# Patient Record
Sex: FEMALE | Race: WHITE | NOT HISPANIC OR LATINO | Employment: PART TIME | URBAN - METROPOLITAN AREA
[De-identification: names, ages, dates, MRNs, and addresses within clinical notes are randomized per-mention and may not be internally consistent; named-entity substitution may affect disease eponyms.]

---

## 2022-04-18 ENCOUNTER — APPOINTMENT (OUTPATIENT)
Dept: CT IMAGING | Facility: HOSPITAL | Age: 37
End: 2022-04-18

## 2022-04-18 ENCOUNTER — APPOINTMENT (OUTPATIENT)
Dept: GENERAL RADIOLOGY | Facility: HOSPITAL | Age: 37
End: 2022-04-18

## 2022-04-18 ENCOUNTER — HOSPITAL ENCOUNTER (OUTPATIENT)
Facility: HOSPITAL | Age: 37
Setting detail: OBSERVATION
Discharge: HOME | End: 2022-04-19
Attending: EMERGENCY MEDICINE | Admitting: INTERNAL MEDICINE

## 2022-04-18 DIAGNOSIS — R53.1 RIGHT SIDED WEAKNESS: ICD-10-CM

## 2022-04-18 DIAGNOSIS — R41.841 COGNITIVE COMMUNICATION DEFICIT: ICD-10-CM

## 2022-04-18 DIAGNOSIS — R20.2 FACIAL PARESTHESIA: Primary | ICD-10-CM

## 2022-04-18 PROBLEM — R40.0 SOMNOLENCE: Status: ACTIVE | Noted: 2022-04-18

## 2022-04-18 PROBLEM — Z72.0 TOBACCO ABUSE: Status: ACTIVE | Noted: 2022-04-18

## 2022-04-18 LAB
ALT SERPL W P-5'-P-CCNC: 15 U/L (ref 1–33)
AMPHET+METHAMPHET UR QL: NEGATIVE
AMPHETAMINES UR QL: NEGATIVE
APTT PPP: 29.5 SECONDS (ref 22–39)
AST SERPL-CCNC: 17 U/L (ref 1–32)
BARBITURATES UR QL SCN: NEGATIVE
BASOPHILS # BLD AUTO: 0.03 10*3/MM3 (ref 0–0.2)
BASOPHILS NFR BLD AUTO: 0.4 % (ref 0–1.5)
BENZODIAZ UR QL SCN: NEGATIVE
BILIRUB UR QL STRIP: NEGATIVE
BUN BLDA-MCNC: 14 MG/DL (ref 8–26)
BUPRENORPHINE SERPL-MCNC: POSITIVE NG/ML
CA-I BLDA-SCNC: 1.23 MMOL/L (ref 1.2–1.32)
CANNABINOIDS SERPL QL: NEGATIVE
CHLORIDE BLDA-SCNC: 104 MMOL/L (ref 98–109)
CLARITY UR: CLEAR
CO2 BLDA-SCNC: 26 MMOL/L (ref 24–29)
COCAINE UR QL: NEGATIVE
COLOR UR: YELLOW
CREAT BLDA-MCNC: 0.7 MG/DL (ref 0.6–1.3)
DEPRECATED RDW RBC AUTO: 38.8 FL (ref 37–54)
EGFRCR SERPLBLD CKD-EPI 2021: 113 ML/MIN/1.73
EOSINOPHIL # BLD AUTO: 0.04 10*3/MM3 (ref 0–0.4)
EOSINOPHIL NFR BLD AUTO: 0.6 % (ref 0.3–6.2)
ERYTHROCYTE [DISTWIDTH] IN BLOOD BY AUTOMATED COUNT: 12 % (ref 12.3–15.4)
ETHANOL BLD-MCNC: <10 MG/DL (ref 0–10)
FLUAV SUBTYP SPEC NAA+PROBE: NOT DETECTED
FLUBV RNA ISLT QL NAA+PROBE: NOT DETECTED
GLUCOSE BLDC GLUCOMTR-MCNC: 90 MG/DL (ref 70–130)
GLUCOSE UR STRIP-MCNC: NEGATIVE MG/DL
HCT VFR BLD AUTO: 35.8 % (ref 34–46.6)
HCT VFR BLDA CALC: 37 % (ref 38–51)
HGB BLD-MCNC: 12.4 G/DL (ref 12–15.9)
HGB BLDA-MCNC: 12.6 G/DL (ref 12–17)
HGB UR QL STRIP.AUTO: NEGATIVE
HOLD SPECIMEN: NORMAL
IMM GRANULOCYTES # BLD AUTO: 0.01 10*3/MM3 (ref 0–0.05)
IMM GRANULOCYTES NFR BLD AUTO: 0.1 % (ref 0–0.5)
INR PPP: 0.9 (ref 0.8–1.2)
KETONES UR QL STRIP: NEGATIVE
LEUKOCYTE ESTERASE UR QL STRIP.AUTO: NEGATIVE
LYMPHOCYTES # BLD AUTO: 1.98 10*3/MM3 (ref 0.7–3.1)
LYMPHOCYTES NFR BLD AUTO: 29.7 % (ref 19.6–45.3)
MCH RBC QN AUTO: 30.4 PG (ref 26.6–33)
MCHC RBC AUTO-ENTMCNC: 34.6 G/DL (ref 31.5–35.7)
MCV RBC AUTO: 87.7 FL (ref 79–97)
METHADONE UR QL SCN: NEGATIVE
MONOCYTES # BLD AUTO: 0.38 10*3/MM3 (ref 0.1–0.9)
MONOCYTES NFR BLD AUTO: 5.7 % (ref 5–12)
NEUTROPHILS NFR BLD AUTO: 4.23 10*3/MM3 (ref 1.7–7)
NEUTROPHILS NFR BLD AUTO: 63.5 % (ref 42.7–76)
NITRITE UR QL STRIP: NEGATIVE
NRBC BLD AUTO-RTO: 0 /100 WBC (ref 0–0.2)
OPIATES UR QL: NEGATIVE
OXYCODONE UR QL SCN: NEGATIVE
PCP UR QL SCN: NEGATIVE
PH UR STRIP.AUTO: 7.5 [PH] (ref 5–8)
PLATELET # BLD AUTO: 198 10*3/MM3 (ref 140–450)
PMV BLD AUTO: 10.2 FL (ref 6–12)
POTASSIUM BLDA-SCNC: 3.5 MMOL/L (ref 3.5–4.9)
PROPOXYPH UR QL: NEGATIVE
PROT UR QL STRIP: NEGATIVE
PROTHROMBIN TIME: 11.4 SECONDS (ref 12.8–15.2)
RBC # BLD AUTO: 4.08 10*6/MM3 (ref 3.77–5.28)
SARS-COV-2 RNA PNL SPEC NAA+PROBE: NOT DETECTED
SODIUM BLD-SCNC: 139 MMOL/L (ref 138–146)
SP GR UR STRIP: 1.05 (ref 1–1.03)
TRICYCLICS UR QL SCN: NEGATIVE
TROPONIN T SERPL-MCNC: <0.01 NG/ML (ref 0–0.03)
UROBILINOGEN UR QL STRIP: ABNORMAL
WBC NRBC COR # BLD: 6.67 10*3/MM3 (ref 3.4–10.8)
WHOLE BLOOD HOLD SPECIMEN: NORMAL
WHOLE BLOOD HOLD SPECIMEN: NORMAL

## 2022-04-18 PROCEDURE — 93005 ELECTROCARDIOGRAM TRACING: CPT | Performed by: EMERGENCY MEDICINE

## 2022-04-18 PROCEDURE — 85014 HEMATOCRIT: CPT

## 2022-04-18 PROCEDURE — 85610 PROTHROMBIN TIME: CPT

## 2022-04-18 PROCEDURE — 82077 ASSAY SPEC XCP UR&BREATH IA: CPT | Performed by: EMERGENCY MEDICINE

## 2022-04-18 PROCEDURE — 99220 PR INITIAL OBSERVATION CARE/DAY 70 MINUTES: CPT | Performed by: INTERNAL MEDICINE

## 2022-04-18 PROCEDURE — 96374 THER/PROPH/DIAG INJ IV PUSH: CPT

## 2022-04-18 PROCEDURE — 0 IOPAMIDOL PER 1 ML: Performed by: EMERGENCY MEDICINE

## 2022-04-18 PROCEDURE — 99284 EMERGENCY DEPT VISIT MOD MDM: CPT

## 2022-04-18 PROCEDURE — G0378 HOSPITAL OBSERVATION PER HR: HCPCS

## 2022-04-18 PROCEDURE — 70496 CT ANGIOGRAPHY HEAD: CPT

## 2022-04-18 PROCEDURE — 80306 DRUG TEST PRSMV INSTRMNT: CPT | Performed by: EMERGENCY MEDICINE

## 2022-04-18 PROCEDURE — 99204 OFFICE O/P NEW MOD 45 MIN: CPT

## 2022-04-18 PROCEDURE — 81003 URINALYSIS AUTO W/O SCOPE: CPT | Performed by: EMERGENCY MEDICINE

## 2022-04-18 PROCEDURE — 85730 THROMBOPLASTIN TIME PARTIAL: CPT | Performed by: EMERGENCY MEDICINE

## 2022-04-18 PROCEDURE — C9803 HOPD COVID-19 SPEC COLLECT: HCPCS

## 2022-04-18 PROCEDURE — 0042T HC CT CEREBRAL PERFUSION W/WO CONTRAST: CPT

## 2022-04-18 PROCEDURE — 84484 ASSAY OF TROPONIN QUANT: CPT | Performed by: EMERGENCY MEDICINE

## 2022-04-18 PROCEDURE — 80047 BASIC METABLC PNL IONIZED CA: CPT

## 2022-04-18 PROCEDURE — 84460 ALANINE AMINO (ALT) (SGPT): CPT | Performed by: EMERGENCY MEDICINE

## 2022-04-18 PROCEDURE — 85025 COMPLETE CBC W/AUTO DIFF WBC: CPT | Performed by: EMERGENCY MEDICINE

## 2022-04-18 PROCEDURE — 87636 SARSCOV2 & INF A&B AMP PRB: CPT | Performed by: EMERGENCY MEDICINE

## 2022-04-18 PROCEDURE — 70498 CT ANGIOGRAPHY NECK: CPT

## 2022-04-18 PROCEDURE — 25010000002 LORAZEPAM PER 2 MG: Performed by: INTERNAL MEDICINE

## 2022-04-18 PROCEDURE — 70450 CT HEAD/BRAIN W/O DYE: CPT

## 2022-04-18 PROCEDURE — 71045 X-RAY EXAM CHEST 1 VIEW: CPT

## 2022-04-18 PROCEDURE — 84450 TRANSFERASE (AST) (SGOT): CPT | Performed by: EMERGENCY MEDICINE

## 2022-04-18 RX ORDER — POTASSIUM CHLORIDE 1.5 G/1.77G
40 POWDER, FOR SOLUTION ORAL AS NEEDED
Status: DISCONTINUED | OUTPATIENT
Start: 2022-04-18 | End: 2022-04-19 | Stop reason: HOSPADM

## 2022-04-18 RX ORDER — BUPRENORPHINE HYDROCHLORIDE AND NALOXONE HYDROCHLORIDE DIHYDRATE 8; 2 MG/1; MG/1
1 TABLET SUBLINGUAL 2 TIMES DAILY
COMMUNITY

## 2022-04-18 RX ORDER — SODIUM CHLORIDE 0.9 % (FLUSH) 0.9 %
10 SYRINGE (ML) INJECTION EVERY 12 HOURS SCHEDULED
Status: DISCONTINUED | OUTPATIENT
Start: 2022-04-18 | End: 2022-04-19 | Stop reason: HOSPADM

## 2022-04-18 RX ORDER — ASPIRIN 300 MG/1
300 SUPPOSITORY RECTAL DAILY
Status: DISCONTINUED | OUTPATIENT
Start: 2022-04-18 | End: 2022-04-19 | Stop reason: HOSPADM

## 2022-04-18 RX ORDER — LORAZEPAM 2 MG/ML
1 INJECTION INTRAMUSCULAR EVERY 4 HOURS PRN
Status: DISCONTINUED | OUTPATIENT
Start: 2022-04-18 | End: 2022-04-19

## 2022-04-18 RX ORDER — POTASSIUM CHLORIDE 750 MG/1
40 CAPSULE, EXTENDED RELEASE ORAL AS NEEDED
Status: DISCONTINUED | OUTPATIENT
Start: 2022-04-18 | End: 2022-04-19 | Stop reason: HOSPADM

## 2022-04-18 RX ORDER — NICOTINE 21 MG/24HR
1 PATCH, TRANSDERMAL 24 HOURS TRANSDERMAL
Status: DISCONTINUED | OUTPATIENT
Start: 2022-04-19 | End: 2022-04-18

## 2022-04-18 RX ORDER — CLONAZEPAM 0.5 MG/1
0.5 TABLET ORAL 3 TIMES DAILY
COMMUNITY

## 2022-04-18 RX ORDER — BUPRENORPHINE HYDROCHLORIDE AND NALOXONE HYDROCHLORIDE DIHYDRATE 8; 2 MG/1; MG/1
1 TABLET SUBLINGUAL 2 TIMES DAILY
Status: DISCONTINUED | OUTPATIENT
Start: 2022-04-19 | End: 2022-04-19 | Stop reason: HOSPADM

## 2022-04-18 RX ORDER — SODIUM CHLORIDE 0.9 % (FLUSH) 0.9 %
10 SYRINGE (ML) INJECTION AS NEEDED
Status: DISCONTINUED | OUTPATIENT
Start: 2022-04-18 | End: 2022-04-19 | Stop reason: HOSPADM

## 2022-04-18 RX ORDER — POTASSIUM CHLORIDE 7.45 MG/ML
10 INJECTION INTRAVENOUS
Status: DISCONTINUED | OUTPATIENT
Start: 2022-04-18 | End: 2022-04-19 | Stop reason: HOSPADM

## 2022-04-18 RX ORDER — CLONAZEPAM 0.5 MG/1
0.5 TABLET ORAL 3 TIMES DAILY
Status: DISCONTINUED | OUTPATIENT
Start: 2022-04-19 | End: 2022-04-19

## 2022-04-18 RX ORDER — ASPIRIN 81 MG/1
81 TABLET, CHEWABLE ORAL DAILY
Status: DISCONTINUED | OUTPATIENT
Start: 2022-04-18 | End: 2022-04-19 | Stop reason: HOSPADM

## 2022-04-18 RX ORDER — ATORVASTATIN CALCIUM 40 MG/1
80 TABLET, FILM COATED ORAL NIGHTLY
Status: DISCONTINUED | OUTPATIENT
Start: 2022-04-18 | End: 2022-04-19 | Stop reason: HOSPADM

## 2022-04-18 RX ORDER — NICOTINE 21 MG/24HR
1 PATCH, TRANSDERMAL 24 HOURS TRANSDERMAL EVERY 24 HOURS
Status: DISCONTINUED | OUTPATIENT
Start: 2022-04-18 | End: 2022-04-19 | Stop reason: HOSPADM

## 2022-04-18 RX ADMIN — SODIUM CHLORIDE 1000 ML: 9 INJECTION, SOLUTION INTRAVENOUS at 19:22

## 2022-04-18 RX ADMIN — IOPAMIDOL 125 ML: 755 INJECTION, SOLUTION INTRAVENOUS at 18:55

## 2022-04-18 RX ADMIN — BUPRENORPHINE AND NALOXONE 1 TABLET: 8; 2 TABLET SUBLINGUAL at 23:51

## 2022-04-18 RX ADMIN — POTASSIUM CHLORIDE 40 MEQ: 750 CAPSULE, EXTENDED RELEASE ORAL at 23:03

## 2022-04-18 RX ADMIN — ATORVASTATIN CALCIUM 80 MG: 40 TABLET, FILM COATED ORAL at 22:50

## 2022-04-18 RX ADMIN — LORAZEPAM 1 MG: 2 INJECTION INTRAMUSCULAR; INTRAVENOUS at 23:51

## 2022-04-18 RX ADMIN — NICOTINE 1 PATCH: 21 PATCH, EXTENDED RELEASE TRANSDERMAL at 22:51

## 2022-04-18 RX ADMIN — ASPIRIN 81 MG CHEWABLE TABLET 81 MG: 81 TABLET CHEWABLE at 22:50

## 2022-04-18 NOTE — CONSULTS
Stroke Consult Note    Patient Name: Yolanda Cannon   MRN: [id number]  Age: 37 y.o.  Sex: female  : 1985    Primary Care Physician: Provider, No Known  Referring Physician: Gustavo Juan MD    TIME STROKE TEAM CALLED: 18:26 EST     TIME PATIENT SEEN: 18:30 EST    Handedness: Right handed   Race:     Chief Complaint/Reason for Consultation: Right-sided weakness, headache, dizziness, and nausea    Subjective .  HPI: Ms. Cannon is a 37-year-old  female with a past medical history significant for drug abuse (per patient has been clean since 2021) and depression.  She presents to [facility] ED for further evaluation of right-sided weakness, headache, dizziness, and nausea.  She states that last week her boyfriend tried to wake her up and had a difficult time to wake her.  When he was able to awake her, she had right-sided weakness, numbness and slurred speech.  She did not seek any medical attention at the time.  As of today at work, she stated that she did not feel well and still had right-sided weakness with a headache, dizziness and nausea.  She was sent home from work.    Patient was called as a code stroke via the ED lobby and was immediately taken to CT scanner.  Patient was able to follow commands and participate in neurological exam.  No drift was noted bilateral upper and lower extremities.  She had no facial paralysis but complained of right facial sensory loss.  She has no dysarthria or aphasia.  Her NIH score is a 1.  Advanced imaging was obtained.  CT head revealed no acute abnormalities or hemorrhaging.  CTA head neck revealed no LVO, occlusion or stenosis.  CT perfusion revealed no perfusion deficit.  She denies dizziness at this time.  She states that her headache just started today and her nausea is intermittent.She reports that her right sided weakness has stayed the same since when it started a week ago.    She states that she does not have any difficulty ambulating and  ambulates independently. She is naïve to antiplatelet and anticoagulation therapy.      last Known Normal Date/Time: One week ago     Review of Systems   Constitutional: Negative for fatigue.   HENT: Negative for trouble swallowing.    Eyes: Negative for photophobia and visual disturbance.   Respiratory: Negative for chest tightness and shortness of breath.    Gastrointestinal: Positive for nausea. Negative for vomiting.   Genitourinary: Negative for difficulty urinating.   Musculoskeletal: Negative for gait problem.   Skin: Negative for color change.   Neurological: Positive for weakness and headaches. Negative for dizziness, facial asymmetry, speech difficulty and numbness.   Psychiatric/Behavioral: Negative for agitation, behavioral problems and confusion.      No past medical history on file.  No past surgical history on file.  No family history on file.  Social History     Socioeconomic History   • Marital status: Single     No Known Allergies  Prior to Admission medications    Not on File             Objective     Temp:  [98.1 °F (36.7 °C)] 98.1 °F (36.7 °C)  Heart Rate:  [102] 102  Resp:  [16] 16  BP: (125)/(81) 125/81  Neurological Exam  Mental Status  Awake, alert and oriented to person, place and time.Alert. Oriented to person, place and time. Oriented to person, place, and time. Memory is normal. Speech is normal. Language is fluent with no aphasia. Attention and concentration are normal.    Cranial Nerves  CN I: Sense of smell is normal.  CN II: Right visual acuity: counts fingers. Left visual acuity: counts fingers. Visual fields full to confrontation.  CN III, IV, VI: Extraocular movements intact bilaterally. Pupils equal round and reactive to light bilaterally.  CN V:  Right: Diminished sensation of the entire right side of the face.  Left: Facial sensation is normal on the left.  CN VII: Full and symmetric facial movement.  CN VIII: Bilateral hearing appears to be intact.  CN IX, X: Palate elevates  symmetrically  CN XI: Shoulder shrug strength is normal.  CN XII: Tongue midline without atrophy or fasciculations.    Motor   No fasciculations present. Normal muscle tone. No abnormal involuntary movements.  +5/5 strength in bilateral upper and lower extremities.  No drift noted.    Sensory  Normal sensation.Light touch is normal in upper and lower extremities.     Coordination    Finger-to-nose, rapid alternating movements and heel-to-shin normal bilaterally without dysmetria.Right: Finger-to-nose normal. Heel-to-shin normal.Left: Finger-to-nose normal. Heel-to-shin normal.    Gait  Casual gait is normal including stance, stride, and arm swing.      Physical Exam  Vitals reviewed.   Constitutional:       General: She is not in acute distress.  HENT:      Head: Normocephalic.      Mouth/Throat:      Mouth: Mucous membranes are moist.      Pharynx: Oropharynx is clear.   Eyes:      Extraocular Movements: Extraocular movements intact.      Pupils: Pupils are equal, round, and reactive to light.   Cardiovascular:      Rate and Rhythm: Normal rate and regular rhythm.      Pulses: Normal pulses.   Pulmonary:      Effort: Pulmonary effort is normal. No respiratory distress.   Musculoskeletal:      Right lower leg: No edema.      Left lower leg: No edema.   Skin:     General: Skin is warm and dry.   Neurological:      Mental Status: She is alert and oriented to person, place, and time.      Cranial Nerves: Cranial nerves are intact.      Sensory: Sensation is intact.      Motor: Weakness present.      Coordination: Coordination is intact.   Psychiatric:         Speech: Speech normal.         Behavior: Behavior is cooperative.         Cognition and Memory: Cognition and memory normal.         Acute Stroke Data    IV Thrombolytic (TPA/Tenecteplase) Inclusion / Exclusion Criteria    Time: 18:43 EDT  Person Administering Scale: RAQUEL Espinoza    Inclusion Criteria  [x]   18 years of age or greater   []   Onset of  symptoms < 4.5 hours before beginning treatment (stroke onset = time patient was last seen well or without symptoms).   []   Diagnosis of acute ischemic stroke causing measurable disabling deficit (Complete Hemianopia, Any Aphasia, Visual or Sensory Extinction, Any weakness limiting sustained effort against gravity)   []   Any remaining deficit considered potentially disabling in view of patient and practitioner   Exclusion criteria (Do not proceed with Alteplase if any are checked under exclusion criteria)  [x]   Onset unknown or GREATER than 4.5 hours   []   ICH on CT/MRI   []   CT demonstrates hypodensity representing acute or subacute infarct   []   Significant head trauma or prior stroke in the previous 3 months   []   Symptoms suggestive of subarachnoid hemorrhage   []   History of un-ruptured intracranial aneurysm GREATER than 10 mm   []   Recent intracranial or intraspinal surgery within the last 3 months   []   Arterial puncture at a non-compressible site in the previous 7 days   []   Active internal bleeding   []   Acute bleeding tendency   []   Platelet count LESS than 100,000 for known hematological diseases such as leukemia, thrombocytopenia or chronic cirrhosis   []   Current use of anticoagulant with INR GREATER than 1.7 or PT GREATER than 15 seconds, aPTT GREATER than 40 seconds   []   Heparin received within 48 hours, resulting in abnormally elevated aPTT GREATER than upper limit of normal   []   Current use of direct thrombin inhibitors or direct factor Xa inhibitors in the past 48 hours   []   Elevated blood pressure refractory to treatment (systolic GREATER than 185 mm/Hg or diastolic  GREATER than 110 mm/Hg   []   Suspected infective endocarditis and aortic arch dissection   []   Current use of therapeutic treatment dose of low-molecular-weight heparin (LMWH) within the previous 24 hours   []   Structural GI malignancy or bleed   Relative exclusion for all patients  []   Only minor nondisabling  symptoms   []   Pregnancy   []   Seizure at onset with postictal residual neurological impairments   []   Major surgery or previous trauma within past 14 days   []   History of previous spontaneous ICH, intracranial neoplasm, or AV malformation   []   Postpartum (within previous 14 days)   []   Recent GI or urinary tract hemorrhage (within previous 21 days)   []   Recent acute MI (within previous 3 months)   []   History of unruptured intracranial aneurysm LESS than 10 mm   []   History of ruptured intracranial aneurysm   []   Blood glucose LESS than 50 mg/dL (2.7 mmol/L)   []   Dural puncture within the last 7 days   []   Known GREATER than 10 cerebral microbleeds   Additional exclusions for patients with symptoms onset between 3 and 4.5 hours.  []   Age > 80.   []   On any anticoagulants regardless of INR  >>> Warfarin (Coumadin), Heparin, Enoxaparin (Lovenox), fondaparinux (Arixtra), bivalirudin (Angiomax), Argatroban, dabigatran (Pradaxa), rivaroxaban (Xarelto), or apixaban (Eliquis)   []   Severe stroke (NIHSS > 25).   []   History of BOTH diabetes and previous ischemic stroke.   []   The risks and benefits have been discussed with the patient or family related to the administration of IV alteplase for stroke symptoms.   []   I have discussed and reviewed the patient's case and imaging with the attending prior to IV Thrombolytic (TPA/Tenecteplase).   N/A Time Thrombolytic administered       Hospital Meds:  Scheduled- sodium chloride, 1,000 mL, Intravenous, Once      Infusions-     PRNs- sodium chloride    Functional Status Prior to Current Stroke/Yifan Score: 0    NIH Stroke Scale  Time: 18:43 EDT  Person Administering Scale: RAQUEL Espinoza    1a  Level of consciousness: 0=alert; keenly responsive   1b. LOC questions:  0=Performs both tasks correctly   1c. LOC commands: 0=Performs both tasks correctly   2.  Best Gaze: 0=normal   3.  Visual: 0=No visual loss   4. Facial Palsy: 0=Normal symmetric  movement   5a.  Motor left arm: 0=No drift, limb holds 90 (or 45) degrees for full 10 seconds   5b.  Motor right arm: 0=No drift, limb holds 90 (or 45) degrees for full 10 seconds   6a. motor left le=No drift, limb holds 90 (or 45) degrees for full 10 seconds   6b  Motor right le=No drift, limb holds 90 (or 45) degrees for full 10 seconds   7. Limb Ataxia: 0=Absent   8.  Sensory: 1=Mild to moderate sensory loss; patient feels pinprick is less sharp or is dull on the affected side; there is a loss of superficial pain with pinprick but patient is aware She is being touched   9. Best Language:  0=No aphasia, normal   10. Dysarthria: 0=Normal   11. Extinction and Inattention: 0=No abnormality    Total:   1       Results Reviewed:  I have personally reviewed current lab, radiology, and data and agree with results.       Lab Results   Component Value Date    CREATININE 0.70 2022     WBC   Date Value Ref Range Status   2022 6.67 3.40 - 10.80 10*3/mm3 Final     RBC   Date Value Ref Range Status   2022 4.08 3.77 - 5.28 10*6/mm3 Final     Hemoglobin   Date Value Ref Range Status   2022 12.6 12.0 - 17.0 g/dL Final     Comment:     Serial Number: [id number]:  [id number]   2022 12.4 12.0 - 15.9 g/dL Final     Hematocrit   Date Value Ref Range Status   2022 37 (L) 38 - 51 % Final   2022 35.8 34.0 - 46.6 % Final     MCV   Date Value Ref Range Status   2022 87.7 79.0 - 97.0 fL Final     MCH   Date Value Ref Range Status   2022 30.4 26.6 - 33.0 pg Final     MCHC   Date Value Ref Range Status   2022 34.6 31.5 - 35.7 g/dL Final     RDW   Date Value Ref Range Status   2022 12.0 (L) 12.3 - 15.4 % Final     RDW-SD   Date Value Ref Range Status   2022 38.8 37.0 - 54.0 fl Final     MPV   Date Value Ref Range Status   2022 10.2 6.0 - 12.0 fL Final     Platelets   Date Value Ref Range Status   2022 198 140 - 450 10*3/mm3 Final     Neutrophil %   Date  Value Ref Range Status   04/18/2022 63.5 42.7 - 76.0 % Final     Lymphocyte %   Date Value Ref Range Status   04/18/2022 29.7 19.6 - 45.3 % Final     Monocyte %   Date Value Ref Range Status   04/18/2022 5.7 5.0 - 12.0 % Final     Eosinophil %   Date Value Ref Range Status   04/18/2022 0.6 0.3 - 6.2 % Final     Basophil %   Date Value Ref Range Status   04/18/2022 0.4 0.0 - 1.5 % Final     Immature Grans %   Date Value Ref Range Status   04/18/2022 0.1 0.0 - 0.5 % Final     Neutrophils, Absolute   Date Value Ref Range Status   04/18/2022 4.23 1.70 - 7.00 10*3/mm3 Final     Lymphocytes, Absolute   Date Value Ref Range Status   04/18/2022 1.98 0.70 - 3.10 10*3/mm3 Final     Monocytes, Absolute   Date Value Ref Range Status   04/18/2022 0.38 0.10 - 0.90 10*3/mm3 Final     Eosinophils, Absolute   Date Value Ref Range Status   04/18/2022 0.04 0.00 - 0.40 10*3/mm3 Final     Basophils, Absolute   Date Value Ref Range Status   04/18/2022 0.03 0.00 - 0.20 10*3/mm3 Final     Immature Grans, Absolute   Date Value Ref Range Status   04/18/2022 0.01 0.00 - 0.05 10*3/mm3 Final     nRBC   Date Value Ref Range Status   04/18/2022 0.0 0.0 - 0.2 /100 WBC Final             Assessment/Plan:  This is a 37-year-old  female with a past medical history significant for drug abuse (per patient has been clean since August 2021) and depression.  She presents to [facility] ED for further evaluation of right-sided weakness, headache, dizziness, and nausea. She is naïve to antiplatelet and anticoagulation therapy.      1. Right-sided weakness, headache, dizziness, and nausea   -Differential diagnosis include TIA, AIS, or vertigo    -CTH revealed no acute abnormalities or hemorrhaging   -CTA H/N revealed no LVO, stenosis or occlusion   -CTP revealed no perfusion deficit   -UDS pending   -MRI brain without contrast pending    -TTE with bubble study   -Started ASA 81 mg daily   -NPO until bedside dysphagia screening has been completed.  If  passed, please allow for patient's diet to be regular   -Activity as tolerated   -PT/OT/SLP evaluate and treat   -Neurochecks   -NIHSS assessment   -Hemoglobin A1c in a.m.    2. Hypertension   -Please allow for normalization of blood pressure.    3. Hyperlipidemia    -FLP in the a.m.   -Started atorvastatin 80 mg nightly    Plan of care discussed with Dr. Juan, the patient and the nursing staff.  Neurology stroke will follow.  If there are any questions or concerns please feel to reach out.  Thank you for the consult      RAQUEL Espinoza  April 18, 2022  18:50 EDT
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
## 2022-04-19 ENCOUNTER — APPOINTMENT (OUTPATIENT)
Dept: MRI IMAGING | Facility: HOSPITAL | Age: 37
End: 2022-04-19

## 2022-04-19 ENCOUNTER — APPOINTMENT (OUTPATIENT)
Dept: CARDIOLOGY | Facility: HOSPITAL | Age: 37
End: 2022-04-19

## 2022-04-19 VITALS
BODY MASS INDEX: 19.29 KG/M2 | DIASTOLIC BLOOD PRESSURE: 76 MMHG | RESPIRATION RATE: 16 BRPM | OXYGEN SATURATION: 94 % | WEIGHT: 120 LBS | TEMPERATURE: 98.4 F | HEIGHT: 66 IN | HEART RATE: 78 BPM | SYSTOLIC BLOOD PRESSURE: 118 MMHG

## 2022-04-19 LAB
BH CV ECHO MEAS - AO MAX PG (FULL): 2.5 MMHG
BH CV ECHO MEAS - AO MAX PG: 6.4 MMHG
BH CV ECHO MEAS - AO MEAN PG (FULL): 1 MMHG
BH CV ECHO MEAS - AO MEAN PG: 3.3 MMHG
BH CV ECHO MEAS - AO ROOT AREA (BSA CORRECTED): 1.6
BH CV ECHO MEAS - AO ROOT AREA: 5.2 CM^2
BH CV ECHO MEAS - AO ROOT DIAM: 2.6 CM
BH CV ECHO MEAS - AO V2 MAX: 126.8 CM/SEC
BH CV ECHO MEAS - AO V2 MEAN: 85 CM/SEC
BH CV ECHO MEAS - AO V2 VTI: 29.6 CM
BH CV ECHO MEAS - ASC AORTA: 2.2 CM
BH CV ECHO MEAS - AVA(I,A): 2.3 CM^2
BH CV ECHO MEAS - AVA(I,D): 2.3 CM^2
BH CV ECHO MEAS - AVA(V,A): 2.3 CM^2
BH CV ECHO MEAS - AVA(V,D): 2.3 CM^2
BH CV ECHO MEAS - BSA(HAYCOCK): 1.6 M^2
BH CV ECHO MEAS - BSA: 1.6 M^2
BH CV ECHO MEAS - BZI_BMI: 19.4 KILOGRAMS/M^2
BH CV ECHO MEAS - BZI_METRIC_HEIGHT: 167.6 CM
BH CV ECHO MEAS - BZI_METRIC_WEIGHT: 54.4 KG
BH CV ECHO MEAS - EDV(CUBED): 50.3 ML
BH CV ECHO MEAS - EDV(MOD-SP2): 66 ML
BH CV ECHO MEAS - EDV(MOD-SP4): 49 ML
BH CV ECHO MEAS - EDV(TEICH): 57.8 ML
BH CV ECHO MEAS - EF(CUBED): 82 %
BH CV ECHO MEAS - EF(MOD-BP): 67 %
BH CV ECHO MEAS - EF(MOD-SP2): 68.2 %
BH CV ECHO MEAS - EF(MOD-SP4): 63.3 %
BH CV ECHO MEAS - EF(TEICH): 75.6 %
BH CV ECHO MEAS - ESV(CUBED): 9 ML
BH CV ECHO MEAS - ESV(MOD-SP2): 21 ML
BH CV ECHO MEAS - ESV(MOD-SP4): 18 ML
BH CV ECHO MEAS - ESV(TEICH): 14.1 ML
BH CV ECHO MEAS - FS: 43.6 %
BH CV ECHO MEAS - IVS/LVPW: 0.83
BH CV ECHO MEAS - IVSD: 0.73 CM
BH CV ECHO MEAS - LA DIMENSION: 2.5 CM
BH CV ECHO MEAS - LA/AO: 0.99
BH CV ECHO MEAS - LAD MAJOR: 4.3 CM
BH CV ECHO MEAS - LAT PEAK E' VEL: 13.6 CM/SEC
BH CV ECHO MEAS - LATERAL E/E' RATIO: 7.2
BH CV ECHO MEAS - LV DIASTOLIC VOL/BSA (35-75): 30.4 ML/M^2
BH CV ECHO MEAS - LV MASS(C)D: 81.9 GRAMS
BH CV ECHO MEAS - LV MASS(C)DI: 50.9 GRAMS/M^2
BH CV ECHO MEAS - LV MAX PG: 3.9 MMHG
BH CV ECHO MEAS - LV MEAN PG: 2.3 MMHG
BH CV ECHO MEAS - LV SYSTOLIC VOL/BSA (12-30): 11.2 ML/M^2
BH CV ECHO MEAS - LV V1 MAX: 98.7 CM/SEC
BH CV ECHO MEAS - LV V1 MEAN: 70.8 CM/SEC
BH CV ECHO MEAS - LV V1 VTI: 22.3 CM
BH CV ECHO MEAS - LVIDD: 3.7 CM
BH CV ECHO MEAS - LVIDS: 2.1 CM
BH CV ECHO MEAS - LVLD AP2: 6.4 CM
BH CV ECHO MEAS - LVLD AP4: 6.4 CM
BH CV ECHO MEAS - LVLS AP2: 4.7 CM
BH CV ECHO MEAS - LVLS AP4: 4.7 CM
BH CV ECHO MEAS - LVOT AREA (M): 3.1 CM^2
BH CV ECHO MEAS - LVOT AREA: 3 CM^2
BH CV ECHO MEAS - LVOT DIAM: 2 CM
BH CV ECHO MEAS - LVPWD: 0.87 CM
BH CV ECHO MEAS - MED PEAK E' VEL: 11.7 CM/SEC
BH CV ECHO MEAS - MEDIAL E/E' RATIO: 8.4
BH CV ECHO MEAS - MV A MAX VEL: 58.2 CM/SEC
BH CV ECHO MEAS - MV DEC SLOPE: 388.9 CM/SEC^2
BH CV ECHO MEAS - MV DEC TIME: 0.16 SEC
BH CV ECHO MEAS - MV E MAX VEL: 99.7 CM/SEC
BH CV ECHO MEAS - MV E/A: 1.7
BH CV ECHO MEAS - MV P1/2T MAX VEL: 117.1 CM/SEC
BH CV ECHO MEAS - MV P1/2T: 88.2 MSEC
BH CV ECHO MEAS - MVA P1/2T LCG: 1.9 CM^2
BH CV ECHO MEAS - MVA(P1/2T): 2.5 CM^2
BH CV ECHO MEAS - PA ACC SLOPE: 424.3 CM/SEC^2
BH CV ECHO MEAS - PA ACC TIME: 0.17 SEC
BH CV ECHO MEAS - PA MAX PG: 2.8 MMHG
BH CV ECHO MEAS - PA PR(ACCEL): 4.1 MMHG
BH CV ECHO MEAS - PA V2 MAX: 83 CM/SEC
BH CV ECHO MEAS - RAP SYSTOLE: 3 MMHG
BH CV ECHO MEAS - RVSP: 20 MMHG
BH CV ECHO MEAS - SI(AO): 95 ML/M^2
BH CV ECHO MEAS - SI(CUBED): 25.6 ML/M^2
BH CV ECHO MEAS - SI(LVOT): 41.7 ML/M^2
BH CV ECHO MEAS - SI(MOD-SP2): 28 ML/M^2
BH CV ECHO MEAS - SI(MOD-SP4): 19.3 ML/M^2
BH CV ECHO MEAS - SI(TEICH): 27.1 ML/M^2
BH CV ECHO MEAS - SV(AO): 152.9 ML
BH CV ECHO MEAS - SV(CUBED): 41.3 ML
BH CV ECHO MEAS - SV(LVOT): 67.1 ML
BH CV ECHO MEAS - SV(MOD-SP2): 45 ML
BH CV ECHO MEAS - SV(MOD-SP4): 31 ML
BH CV ECHO MEAS - SV(TEICH): 43.7 ML
BH CV ECHO MEAS - TAPSE (>1.6): 2.2 CM
BH CV ECHO MEAS - TR MAX PG: 17 MMHG
BH CV ECHO MEAS - TR MAX VEL: 174.2 CM/SEC
BH CV ECHO MEASUREMENTS AVERAGE E/E' RATIO: 7.88
BH CV XLRA - RV BASE: 2.9 CM
BH CV XLRA - RV LENGTH: 5 CM
BH CV XLRA - RV MID: 2.4 CM
CHOLEST SERPL-MCNC: 187 MG/DL (ref 0–200)
GLUCOSE BLDC GLUCOMTR-MCNC: 101 MG/DL (ref 70–130)
GLUCOSE BLDC GLUCOMTR-MCNC: 122 MG/DL (ref 70–130)
GLUCOSE BLDC GLUCOMTR-MCNC: 150 MG/DL (ref 70–130)
HBA1C MFR BLD: 5.1 % (ref 4.8–5.6)
HDLC SERPL-MCNC: 52 MG/DL (ref 40–60)
LDLC SERPL CALC-MCNC: 121 MG/DL (ref 0–100)
LDLC/HDLC SERPL: 2.31 {RATIO}
LEFT ATRIUM VOLUME INDEX: 16.2 ML/M^2
LEFT ATRIUM VOLUME: 26 ML
MAXIMAL PREDICTED HEART RATE: 181 BPM
POTASSIUM SERPL-SCNC: 4.6 MMOL/L (ref 3.5–5.2)
STRESS TARGET HR: 154 BPM
TRIGL SERPL-MCNC: 74 MG/DL (ref 0–150)
VLDLC SERPL-MCNC: 14 MG/DL (ref 5–40)

## 2022-04-19 PROCEDURE — G0378 HOSPITAL OBSERVATION PER HR: HCPCS

## 2022-04-19 PROCEDURE — 97161 PT EVAL LOW COMPLEX 20 MIN: CPT

## 2022-04-19 PROCEDURE — 99217 PR OBSERVATION CARE DISCHARGE MANAGEMENT: CPT | Performed by: INTERNAL MEDICINE

## 2022-04-19 PROCEDURE — 92523 SPEECH SOUND LANG COMPREHEN: CPT

## 2022-04-19 PROCEDURE — 99214 OFFICE O/P EST MOD 30 MIN: CPT | Performed by: STUDENT IN AN ORGANIZED HEALTH CARE EDUCATION/TRAINING PROGRAM

## 2022-04-19 PROCEDURE — 83036 HEMOGLOBIN GLYCOSYLATED A1C: CPT

## 2022-04-19 PROCEDURE — 82962 GLUCOSE BLOOD TEST: CPT

## 2022-04-19 PROCEDURE — 93306 TTE W/DOPPLER COMPLETE: CPT

## 2022-04-19 PROCEDURE — 97165 OT EVAL LOW COMPLEX 30 MIN: CPT

## 2022-04-19 PROCEDURE — 84132 ASSAY OF SERUM POTASSIUM: CPT | Performed by: INTERNAL MEDICINE

## 2022-04-19 PROCEDURE — 80061 LIPID PANEL: CPT

## 2022-04-19 PROCEDURE — 70551 MRI BRAIN STEM W/O DYE: CPT

## 2022-04-19 RX ORDER — MAGNESIUM OXIDE 400 MG/1
400 TABLET ORAL DAILY
Qty: 30 TABLET | Refills: 0 | Status: SHIPPED | OUTPATIENT
Start: 2022-04-19 | End: 2022-05-19

## 2022-04-19 RX ORDER — LANOLIN ALCOHOL/MO/W.PET/CERES
1000 CREAM (GRAM) TOPICAL DAILY
Status: DISCONTINUED | OUTPATIENT
Start: 2022-04-19 | End: 2022-04-19 | Stop reason: HOSPADM

## 2022-04-19 RX ORDER — CYANOCOBALAMIN (VITAMIN B-12) 2000 MCG
2000 TABLET ORAL DAILY
Qty: 30 TABLET | Refills: 0 | Status: SHIPPED | OUTPATIENT
Start: 2022-04-19 | End: 2022-05-19

## 2022-04-19 RX ORDER — CLONAZEPAM 0.5 MG/1
0.5 TABLET ORAL 3 TIMES DAILY PRN
Status: DISCONTINUED | OUTPATIENT
Start: 2022-04-19 | End: 2022-04-19 | Stop reason: HOSPADM

## 2022-04-19 RX ORDER — LANOLIN ALCOHOL/MO/W.PET/CERES
400 CREAM (GRAM) TOPICAL DAILY
Status: DISCONTINUED | OUTPATIENT
Start: 2022-04-19 | End: 2022-04-19 | Stop reason: HOSPADM

## 2022-04-19 RX ORDER — LANOLIN ALCOHOL/MO/W.PET/CERES
400 CREAM (GRAM) TOPICAL DAILY
Qty: 30 TABLET | Refills: 0 | Status: SHIPPED | OUTPATIENT
Start: 2022-04-19 | End: 2022-05-19

## 2022-04-19 RX ADMIN — CLONAZEPAM 0.5 MG: 0.5 TABLET ORAL at 15:56

## 2022-04-19 RX ADMIN — POTASSIUM CHLORIDE 40 MEQ: 750 CAPSULE, EXTENDED RELEASE ORAL at 03:11

## 2022-04-19 RX ADMIN — ASPIRIN 81 MG CHEWABLE TABLET 81 MG: 81 TABLET CHEWABLE at 08:23

## 2022-04-19 RX ADMIN — CLONAZEPAM 0.5 MG: 0.5 TABLET ORAL at 08:23

## 2022-04-19 RX ADMIN — BUPRENORPHINE AND NALOXONE 1 TABLET: 8; 2 TABLET SUBLINGUAL at 08:23

## 2022-04-19 NOTE — THERAPY DISCHARGE NOTE
Patient Name: Yolanda Cannon  : 1985    MRN: [id number]                              Today's Date: 2022       Admit Date: 2022    Visit Dx:     ICD-10-CM ICD-9-CM   1. Facial paresthesia  R20.2 782.0   2. Right sided weakness  R53.1 728.87     Patient Active Problem List   Diagnosis   • Somnolence   • Tobacco abuse     Past Medical History:   Diagnosis Date   • Anxiety    • Drug abuse (HCC)    • MRSA (methicillin resistant staph aureus) culture positive      Past Surgical History:   Procedure Laterality Date   • SKIN SURGERY      above eyebrow for MRSA      General Information     Row Name 22 0908          OT Time and Intention    Document Type discharge evaluation/summary  -MR     Mode of Treatment individual therapy;occupational therapy  -MR     Row Name 22          General Information    Patient Profile Reviewed yes  -MR     Prior Level of Function independent:;all household mobility;community mobility;gait;transfer;bed mobility;ADL's;work;cooking;home management;cleaning  -MR     Existing Precautions/Restrictions other (see comments)  R sided weakness, numbness and tingling  -MR     Barriers to Rehab none identified  -MR     Row Name 22          Living Environment    People in Home spouse;friend(s)  -MR     Row Name 22          Home Main Entrance    Number of Stairs, Main Entrance other (see comments)  13 steps  -MR     Stair Railings, Main Entrance railings safe and in good condition  -MR     Row Name 22          Stairs Within Home, Primary    Stairs, Within Home, Primary Pt lives in second floor apt w/ 13 steps to enter  -MR     Number of Stairs, Within Home, Primary none  -MR     Stair Railings, Within Home, Primary none  -MR     Row Name 22          Cognition    Orientation Status (Cognition) oriented x 4  -MR     Row Name 22          Safety Issues, Functional Mobility    Impairments Affecting Function (Mobility)  balance;strength;endurance/activity tolerance  -MR           User Key  (r) = Recorded By, (t) = Taken By, (c) = Cosigned By    Initials Name Provider Type     Cuco Murryelle, DEANNE Occupational Therapist                 Mobility/ADL's     Row Name 04/19/22 0910          Bed Mobility    Bed Mobility supine-sit;sit-supine  -MR     Supine-Sit [place] (Bed Mobility) independent  -MR     Sit-Supine [place] (Bed Mobility) independent  -MR     Comment, (Bed Mobility) Independent for all aspects of bed mobility this date.  -MR     Row Name 04/19/22 0910          Transfers    Transfers sit-stand transfer;stand-sit transfer  -MR     Sit-Stand [place] (Transfers) standby assist  -MR     Stand-Sit [place] (Transfers) standby assist  -MR     Row Name 04/19/22 0910          Sit-Stand Transfer    Assistive Device (Sit-Stand Transfers) other (see comments)  unsupported w/o AD  -MR     Row Name 04/19/22 0910          Stand-Sit Transfer    Assistive Device (Stand-Sit Transfers) other (see comments)  unsupported w/o AD  -MR     Row Name 04/19/22 0910          Functional Mobility    Functional Mobility- Ind. Level supervision required  -MR     Functional Mobility- Device other (see comments)  w/o AD  -MR     Functional Mobility-Distance (Feet) --  household distances  -MR     Row Name 04/19/22 0910          Activities of Daily Living    BADL Assessment/Intervention lower body dressing  -MR     Row Name 04/19/22 0910          Lower Body Dressing Assessment/Training    [place] Level (Lower Body Dressing) doff;don;socks;independent  -MR     Position (Lower Body Dressing) edge of bed sitting  -MR           User Key  (r) = Recorded By, (t) = Taken By, (c) = Cosigned By    Initials Name Provider Type    MR MurryCucoSuzanne, DEANNE Occupational Therapist               Obj/Interventions     Row Name 04/19/22 0912          Sensory Assessment (Somatosensory)    Sensory Assessment (Somatosensory) other (see comments)  Numness  and tingling in the R hand and R foot  -MR     Row Name 04/19/22 0912          Vision Assessment/Intervention    Visual Impairment/Limitations WFL  -MR     Row Name 04/19/22 0912          Range of Motion Comprehensive    General Range of Motion bilateral upper extremity ROM WFL  -MR     Row Name 04/19/22 0912          Strength Comprehensive (MMT)    Comment, General Manual Muscle Testing (MMT) Assessment RUE grossly 3+/5 in all functional planes; LUE grossly 4/5 in all functional planes  -MR     Row Name 04/19/22 0912          Balance    Balance Assessment sitting static balance;sitting dynamic balance;standing static balance;standing dynamic balance  -MR     Static Sitting Balance independent  -MR     Dynamic Sitting Balance independent  -MR     Position, Sitting Balance unsupported;sitting edge of bed  -MR     Static Standing Balance standby assist  -MR     Dynamic Standing Balance supervision  -MR     Position/Device Used, Standing Balance unsupported  -MR     Balance Interventions sitting;standing;sit to stand;supported;static;dynamic;minimal challenge;occupation based/functional task  -MR     Comment, Balance No LOB noted w/ mobility. Pt reporting the RLE feels different, heavy and numb.  -MR           User Key  (r) = Recorded By, (t) = Taken By, (c) = Cosigned By    Initials Name Provider Type    MR Suzanne Murry, OT Occupational Therapist               Goals/Plan    No documentation.                Clinical Impression     Row Name 04/19/22 0913          Pain Assessment    Pretreatment Pain Rating 0/10 - no pain  -MR     Posttreatment Pain Rating 0/10 - no pain  -MR     Pre/Posttreatment Pain Comment Denied pain pre and post session  -MR     Pain Intervention(s) Ambulation/increased activity;Repositioned  -MR     Row Name 04/19/22 0913          Plan of Care Review    Plan of Care Reviewed With patient  -MR     Progress no change  Initial Eval  -MR     Outcome Evaluation OT eval completed. Pt presenting w/  generalized weakness RUE/RLE > LUE/LLE, numbness/tingling, decreased activity tolerance/endurance and impaired mobility. Independent w/ bed mobility, SUP for STS and functional mobility w/o AD, Independent w/ LB dressing. No OT needs assessed or verbalized at this time, OT signing off. Recommendation upon d/c for HWA.  -MR Lay Name 04/19/22 0913          Therapy Assessment/Plan (OT)    Criteria for Skilled Therapeutic Interventions Met (OT) no;no problems identified which require skilled intervention  -MR     Therapy Frequency (OT) evaluation only  -MR Lay Name 04/19/22 0913          Therapy Plan Review/Discharge Plan (OT)    Anticipated Discharge Disposition (OT) home with 24/7 care  -MR Lay Name 04/19/22 0913          Vital Signs    Pre Systolic BP Rehab 109  -MR     Pre Treatment Diastolic BP 71  -MR     Post Systolic BP Rehab 110  -MR     Post Treatment Diastolic BP 79  -MR     Pretreatment Heart Rate (beats/min) 87  -MR     Posttreatment Heart Rate (beats/min) 89  -MR     Pre SpO2 (%) 97  -MR     O2 Delivery Pre Treatment room air  -MR     Post SpO2 (%) 98  -MR     O2 Delivery Post Treatment room air  -MR     Pre Patient Position Supine  -MR     Intra Patient Position Standing  -MR     Post Patient Position Supine  -MR     Row Name 04/19/22 0913          Positioning and Restraints    Pre-Treatment Position in bed  -MR     Post Treatment Position bed  -MR     In Bed notified nsg;supine;call light within reach;side rails up x2;side lying left  Pt has been ad abdon in room per nursing. Exit alarm deferred upon OT arrival.  -MR           User Key  (r) = Recorded By, (t) = Taken By, (c) = Cosigned By    Initials Name Provider Type    Suzanne Washburn, OT Occupational Therapist               Outcome Measures     Row Name 04/19/22 0917          How much help from another is currently needed...    Putting on and taking off regular lower body clothing? 4  -MR     Bathing (including washing, rinsing, and  drying) 4  -MR     Toileting (which includes using toilet bed pan or urinal) 4  -MR     Putting on and taking off regular upper body clothing 4  -MR     Taking care of personal grooming (such as brushing teeth) 4  -MR     Eating meals 4  -MR     AM-PAC 6 Clicks Score (OT) 24  -MR     Row Name 04/19/22 0800          How much help from another person do you currently need...    Turning from your back to your side while in flat bed without using bedrails? 4  -MRA     Moving from lying on back to sitting on the side of a flat bed without bedrails? 4  -MRA     Moving to and from a bed to a chair (including a wheelchair)? 4  -MRA     Standing up from a chair using your arms (e.g., wheelchair, bedside chair)? 4  -MRA     Climbing 3-5 steps with a railing? 4  -MRA     To walk in hospital room? 4  -MRA     AM-PAC 6 Clicks Score (PT) 24  -MRA     Row Name 04/19/22 0917          Functional Assessment    Outcome Measure Options AM-PAC 6 Clicks Daily Activity (OT)  -MR           User Key  (r) = Recorded By, (t) = Taken By, (c) = Cosigned By    Initials Name Provider Type    MRA Noelle Brody, RN Registered Nurse    MR Suzanne Murry, OT Occupational Therapist                Occupational Therapy Education                 Title: PT OT SLP Therapies (In Progress)     Topic: Occupational Therapy (Done)     Point: ADL training (Done)     Description:   Instruct learner(s) on proper safety adaptation and remediation techniques during self care or transfers.   Instruct in proper use of assistive devices.              Learning Progress Summary           Patient Acceptance, E, VU by MR at 4/19/2022 0918                   Point: Home exercise program (Done)     Description:   Instruct learner(s) on appropriate technique for monitoring, assisting and/or progressing therapeutic exercises/activities.              Learning Progress Summary           Patient Acceptance, E, VU by MR at 4/19/2022 0918                   Point: Precautions (Done)      Description:   Instruct learner(s) on prescribed precautions during self-care and functional transfers.              Learning Progress Summary           Patient Acceptance, E, VU by MR at 4/19/2022 0918                   Point: Body mechanics (Done)     Description:   Instruct learner(s) on proper positioning and spine alignment during self-care, functional mobility activities and/or exercises.              Learning Progress Summary           Patient Acceptance, E, VU by MR at 4/19/2022 0918                               User Key     Initials Effective Dates Name Provider Type Discipline    MR 10/06/21 -  Kelly Murry OT Occupational Therapist OT              OT Recommendation and Plan  Therapy Frequency (OT): evaluation only  Plan of Care Review  Plan of Care Reviewed With: patient  Progress: no change (Initial Eval)  Outcome Evaluation: OT eval completed. Pt presenting w/ generalized weakness RUE/RLE > LUE/LLE, numbness/tingling, decreased activity tolerance/endurance and impaired mobility. Independent w/ bed mobility, SUP for STS and functional mobility w/o AD, Independent w/ LB dressing. No OT needs assessed or verbalized at this time, OT signing off. Recommendation upon d/c for HWA.     Time Calculation:    Time Calculation- OT     Row Name 04/19/22 0922             Time Calculation- OT    OT Start Time 0848  -MR      OT Received On 04/19/22  -MR              Untimed Charges    OT Eval/Re-eval Minutes 35  -MR              Total Minutes    Untimed Charges Total Minutes 35  -MR       Total Minutes 35  -MR            User Key  (r) = Recorded By, (t) = Taken By, (c) = Cosigned By    Initials Name Provider Type    MR Kelly Murry OT Occupational Therapist              Therapy Charges for Today     Code Description Service Date Service Provider Modifiers Qty    [id number]  OT EVAL LOW COMPLEXITY 3 4/19/2022 Kelly Murry OT GO 1               KELLY MURRY OT  4/19/2022

## 2022-04-19 NOTE — PLAN OF CARE
Goal Outcome Evaluation:      VSS on RA, no complaints per patient, stroke workup in progress, will continue to monitor

## 2022-04-19 NOTE — CASE MANAGEMENT/SOCIAL WORK
Discharge Planning Assessment  [facility]     Patient Name: Yolanda Cannon  MRN: [id number]  Today's Date: 4/19/2022    Admit Date: 4/18/2022     Discharge Needs Assessment     Row Name 04/19/22 1007       Living Environment    People in Home significant other;friend(s)    Name(s) of People in Home [place] ([place]) [phone]    Current Living Arrangements apartment    Primary Care Provided by self    Provides Primary Care For no one    Family Caregiver if Needed significant other    Family Caregiver Names [place] ([place])    Able to Return to Prior Arrangements yes       Resource/Environmental Concerns    Resource/Environmental Concerns none    Transportation Concerns none       Transition Planning    Patient/Family Anticipates Transition to home with family    Patient/Family Anticipated Services at Transition none    Transportation Anticipated family or friend will provide       Discharge Needs Assessment    Readmission Within the Last 30 Days no previous admission in last 30 days    Equipment Currently Used at Home none    Concerns to be Addressed denies needs/concerns at this time    Anticipated Changes Related to Illness none    Equipment Needed After Discharge none               Discharge Plan     Row Name 04/19/22 1008       Plan    Plan Home with SO    Patient/Family in Agreement with Plan yes    Plan Comments Spoke with patient at bedside. Lives with [place] (CONNIE) in [facility]. Is independent with ADL's. No problems with [place] Medicaid or medications. Uses no DME at home. Has no advanced directives. Plan is home with CONNIE. CM will continue to follow.    Final Discharge Disposition Code 01 - home or self-care              Continued Care and Services - Admitted Since 4/18/2022    Coordination has not been started for this encounter.          Demographic Summary     Row Name 04/19/22 1006       General Information    Admission Type observation    Arrived From emergency department    Referral Source  admission list    Reason for Consult discharge planning    Preferred Language English       Contact Information    Permission Granted to Share Info With     Contact Information Obtained for     Contact Information Comments Needs a new [facility] PCP               Functional Status     Row Name 04/19/22 1006       Functional Status    Usual Activity Tolerance excellent    Current Activity Tolerance excellent       Functional Status, IADL    Medications independent    Meal Preparation independent    Housekeeping independent    Laundry independent    Shopping independent       Mental Status    General Appearance WDL WDL       Mental Status Summary    Recent Changes in Mental Status/Cognitive Functioning no changes       Employment/    Employment Status employed part-time               Psychosocial    No documentation.                Abuse/Neglect    No documentation.                Legal    No documentation.                Substance Abuse    No documentation.                Patient Forms    No documentation.                   Giorgio Shaw RN  Discharge Planning Assessment  [facility]     Patient Name: Yolanda Cannon  MRN: [id number]  Today's Date: 4/19/2022    Admit Date: 4/18/2022     Discharge Needs Assessment     Row Name 04/19/22 1007       Living Environment    People in Home significant other;friend(s)    Name(s) of People in Home [place] ([place]) [phone]    Current Living Arrangements apartment    Primary Care Provided by self    Provides Primary Care For no one    Family Caregiver if Needed significant other    Family Caregiver Names [place] ([place])    Able to Return to Prior Arrangements yes       Resource/Environmental Concerns    Resource/Environmental Concerns none    Transportation Concerns none       Transition Planning    Patient/Family Anticipates Transition to home with family    Patient/Family Anticipated Services at Transition none    Transportation Anticipated  family or friend will provide       Discharge Needs Assessment    Readmission Within the Last 30 Days no previous admission in last 30 days    Equipment Currently Used at Home none    Concerns to be Addressed denies needs/concerns at this time    Anticipated Changes Related to Illness none    Equipment Needed After Discharge none               Discharge Plan     Row Name 04/19/22 1008       Plan    Plan Home with SO    Patient/Family in Agreement with Plan yes    Plan Comments Spoke with patient at bedside. Lives with [place] ([place]) in [facility]. Is independent with ADL's. No problems with [place] Medicaid or medications. Uses no DME at home. Has no advanced directives. Plan is home with SO. CM will continue to follow.    Final Discharge Disposition Code 01 - home or self-care              Continued Care and Services - Admitted Since 4/18/2022    Coordination has not been started for this encounter.          Demographic Summary     Row Name 04/19/22 1006       General Information    Admission Type observation    Arrived From emergency department    Referral Source admission list    Reason for Consult discharge planning    Preferred Language English       Contact Information    Permission Granted to Share Info With     Contact Information Obtained for     Contact Information Comments Needs a new [facility] PCP               Functional Status     Row Name 04/19/22 1006       Functional Status    Usual Activity Tolerance excellent    Current Activity Tolerance excellent       Functional Status, IADL    Medications independent    Meal Preparation independent    Housekeeping independent    Laundry independent    Shopping independent       Mental Status    General Appearance WDL WDL       Mental Status Summary    Recent Changes in Mental Status/Cognitive Functioning no changes       Employment/    Employment Status employed part-time               Psychosocial    No documentation.                 Abuse/Neglect    No documentation.                Legal    No documentation.                Substance Abuse    No documentation.                Patient Forms    No documentation.                   Giorgio Shaw RN

## 2022-04-19 NOTE — H&P
[facility]  HISTORY AND PHYSICAL    Patient Name: Yolanda Cannon  : 1985  MRN: [id number]  Primary Care Physician: Provider, No Known  Date of admission: 2022    Subjective   Subjective     Chief Complaint:  Right-sided weakness and headache    HPI:  Yolanda Cannon is a 37 y.o. female with a history of drug abuse in recovery program, depression, presents to the ED with headaches, dizziness, nausea, and right-sided weakness.  Patient reports that last week her boyfriend had a difficult time waking her up.  When she was awake she was noted to have right-sided weakness, numbness, and slurred speech.  She did not receive any medical work-up at that time.  Today at work she was not feeling well and was sent home.  She reports having intermittent nausea, headache since last week, and ongoing right-sided weakness since last week.    She reports that her roommate has told her that she makes strange noises when she sleeps.  No fevers, chills, shortness of air, chest pain, vomiting, diarrhea, abdominal pain, dysuria, or any other complaints at this time.  CT head shows no acute brain ischemia at this time.  CTA head and neck shows no focal decrease cerebral blood flow to suggest an acute infarct and a large vessel territory.  CT perfusion shows no focal area of decreased cerebral blood flow.  Chest x-ray shows no active cardiopulmonary disease.  Patient was given aspirin and 1 L saline in the ED.  Patient is being admitted to the hospital medicine service for further evaluation and management.      Review of Systems   Constitutional: Positive for fatigue. Negative for appetite change, chills and fever.   HENT: Negative for trouble swallowing.    Eyes: Negative.    Respiratory: Negative.    Cardiovascular: Negative.    Gastrointestinal: Positive for abdominal pain and nausea. Negative for diarrhea and vomiting.   Endocrine: Negative.    Genitourinary: Negative.     Musculoskeletal: Negative.    Skin: Negative.    Allergic/Immunologic: Negative.    Neurological: Positive for dizziness, speech difficulty, weakness (Right-sided weakness) and headaches.   Hematological: Negative.    Psychiatric/Behavioral: Negative.         All other systems reviewed and are negative.     Personal History     Past Medical History:   Diagnosis Date   • Anxiety    • Drug abuse (HCC)    • MRSA (methicillin resistant staph aureus) culture positive        Past Surgical History:   Procedure Laterality Date   • SKIN SURGERY      above eyebrow for MRSA       Family History:  family history includes Atrial fibrillation in her mother; Diabetes in her father; Kidney disease in her father; Stroke in her father and mother. Otherwise pertinent FHx was reviewed and unremarkable.     Social History:  reports that she has been smoking cigarettes. She has a 15.00 pack-year smoking history. She has never used smokeless tobacco. She reports previous alcohol use. She reports current drug use. Drugs: Amphetamines and Methamphetamines.  In recovery program. Works at [employer]    Medications:  buprenorphine-naloxone and clonazePAM    No Known Allergies    Objective   Objective     Vital Signs:   Temp:  [98.1 °F (36.7 °C)-99 °F (37.2 °C)] 99 °F (37.2 °C)  Heart Rate:  [] 80  Resp:  [16] 16  BP: (117-125)/(73-82) 117/82    Physical Exam   Patient is alert and talkative in no distress at rest, grubbing on a turkey sandwich  Very THIN  Neck is without mass or JVD  Heart is Reg wo murmur  Lungs are clear wo wheeze or crackle  Abd is soft without HSM or mass, not tender or distended  MAEW- right foot swllon  Skin is without rash  Neurologic exam in nonfocal   Mood is appropriate      Result Review:  I have personally reviewed the results from the time of this admission to 04/18/22 10:47 PM EDT and agree with these findings:  [x]  Laboratory  [x]  Microbiology  [x]  Radiology  []  EKG/Telemetry   []  Cardiology/Vascular    []  Pathology  []  Old records  [x]  Other:  Most notable findings include: UDS positive for buprenorphine, negative for benzos      LAB RESULTS:      Lab 04/18/22 1847 04/18/22 1846   WBC  --  6.67   HEMOGLOBIN  --  12.4   HEMOGLOBIN, POC 12.6  --    HEMATOCRIT  --  35.8   HEMATOCRIT POC 37*  --    PLATELETS  --  198   NEUTROS ABS  --  4.23   IMMATURE GRANS (ABS)  --  0.01   LYMPHS ABS  --  1.98   MONOS ABS  --  0.38   EOS ABS  --  0.04   MCV  --  87.7   PROTIME  --  11.4*   INR  --  0.9   APTT  --  29.5         Lab 04/18/22 1847   CREATININE 0.70   EGFR 113.0         Lab 04/18/22 1846   ALT (SGPT) 15   AST (SGOT) 17         Lab 04/18/22 1846   TROPONIN T <0.010                 UA    Urinalysis 4/18/22   Specific [place], UA 1.054 (A)   Ketones, UA Negative   Blood, UA Negative   Leukocytes, UA Negative   Nitrite, UA Negative   (A) Abnormal value            Microbiology Results (last 10 days)     Procedure Component Value - Date/Time    COVID PRE-OP / PRE-PROCEDURE SCREENING ORDER (NO ISOLATION) - Swab, Nasopharynx [[id number]]  (Normal) Collected: 04/18/22 1922    Lab Status: Final result Specimen: Swab from Nasopharynx Updated: 04/18/22 1958    Narrative:      The following orders were created for panel order COVID PRE-OP / PRE-PROCEDURE SCREENING ORDER (NO ISOLATION) - Swab, Nasopharynx.  Procedure                               Abnormality         Status                     ---------                               -----------         ------                     COVID-19 and FLU A/B PCR...[[id number]]  Normal              Final result                 Please view results for these tests on the individual orders.    COVID-19 and FLU A/B PCR - Swab, Nasopharynx [[id number]]  (Normal) Collected: 04/18/22 1922    Lab Status: Final result Specimen: Swab from Nasopharynx Updated: 04/18/22 1958     COVID19 Not Detected     Influenza A PCR Not Detected     Influenza B PCR Not Detected    Narrative:      Fact sheet for  providers: https://www.fda.gov/media/244100/download    Fact sheet for patients: https://www.fda.gov/media/948022/download    Test performed by PCR.          CT Angiogram Neck    Result Date: 4/18/2022    EXAMINATION: CT CEREBRAL PERFUSION W WO CONTRAST-, CT ANGIOGRAM HEAD W AI ANALYSIS OF LVO-, CT ANGIOGRAM NECK-  DATE OF EXAM: 4/18/2022 6:41 PM  INDICATION: Neuro deficit, acute, stroke suspected.  COMPARISON: None available.  TECHNIQUE: Routine transaxial cuts were obtained through the head without administration of contrast. Routine transaxial cuts were then obtained through the head following the intravenous administration of mL of Isovue 300. Core blood volume, core blood flow, mean transit time, and Tmax images were obtained utilizing the Rapid software protocol. A limited CT angiogram of the head was also performed to measure the blood vessel density. Subsequently, CTA of the head and neck was performed with reconstructions.  The radiation dose reduction device was turned on for each scan per the ALARA (As Low as Reasonably Achievable) protocol. .  FINDINGS: CT Perfusion: CBF (<30%) volume: 0 mL Tmax (>6.0s) volume: 0 mL Mismatch volume: 0 mL Mismatch ratio: None  The examination appears to be technically adequate. There is no reduced cerebral blood volume (CBV) or reduced cerebral blood flow (CBF) to suggest an area of acute infarction in a large vessel territory. The cerebral perfusion appears symmetric. No increased mean transit time (MTT) is seen. No areas of mismatch to suggest presence of a penumbra.  CTA head/neck:  Vascular: Normal arch anatomy. Bilateral common carotid arteries are patent without stenosis. Patent carotid bifurcation bilaterally. Cervical and intracranial segments of both internal carotid arteries are patent without stenosis. Both vertebral arteries have normal subclavian origins. Both vertebral arteries are patent without stenosis. Basilar artery is patent without stenosis. The  proximal anterior, middle, and posterior cerebral arteries are patent. The peripheral cerebral branches are symmetric. No aneurysm is demonstrated  Extravascular: No acute abnormality        Impression: 1. No focal area of decreased cerebral blood flow (CBF) is seen to suggest an acute infarct in a large vessel territory.  No defects are seen to suggest a core infarct or an area of reversible ischemia. 2. No carotid or vertebrobasilar occlusion or significant stenosis. No central intracranial arterial occlusion.   These results were communicated by telephone to the stroke team navigator at 7:36 PM on 4/18/2022     This report was finalized on 4/18/2022 7:36 PM by Willi eWstbrook.      XR Chest 1 View    Result Date: 4/18/2022  DATE OF EXAM: 4/18/2022 7:11 PM  PROCEDURE: XR CHEST 1 VW-  INDICATIONS: Acute Stroke Protocol (onset < 12 hrs)  COMPARISON: No comparisons available.  TECHNIQUE: Single radiographic AP view of the chest was obtained.  FINDINGS: Heart size and pulmonary vasculature are within normal limits. Lungs clear. Costophrenic angles sharp      Impression: No active cardiopulmonary disease  This report was finalized on 4/18/2022 7:56 PM by Willi Westbrook.      CT Head Without Contrast Stroke Protocol    Result Date: 4/18/2022  DATE OF EXAM: 4/18/2022 6:36 PM  PROCEDURE: CT HEAD WO CONTRAST STROKE PROTOCOL-  INDICATIONS: Neuro deficit, acute, stroke suspected  COMPARISON: No comparisons available.  TECHNIQUE: Routine transaxial and coronal reconstruction images were obtained through the head without the administration of contrast. Automated exposure control and iterative reconstruction methods were used.  The radiation dose reduction device was turned on for each scan per the ALARA (As Low as Reasonably Achievable) protocol.  FINDINGS: There is no hemorrhage, edema, or mass effect. CSF spaces are normal. There are no abnormal extra-axial fluid collections. There is no acute skull abnormality       Impression:  1. No CT findings of acute brain ischemia at this time 2. No intracranial hemorrhage  Exam time shown is 6:36 PM. Study was reviewed on the workstation and discussed with the stroke team navigator at 7:23 PM on 4/18/2022.  This report was finalized on 4/18/2022 7:23 PM by Willi Westbrook.      CT Angiogram Head w AI Analysis of LVO    Result Date: 4/18/2022    EXAMINATION: CT CEREBRAL PERFUSION W WO CONTRAST-, CT ANGIOGRAM HEAD W AI ANALYSIS OF LVO-, CT ANGIOGRAM NECK-  DATE OF EXAM: 4/18/2022 6:41 PM  INDICATION: Neuro deficit, acute, stroke suspected.  COMPARISON: None available.  TECHNIQUE: Routine transaxial cuts were obtained through the head without administration of contrast. Routine transaxial cuts were then obtained through the head following the intravenous administration of mL of Isovue 300. Core blood volume, core blood flow, mean transit time, and Tmax images were obtained utilizing the Rapid software protocol. A limited CT angiogram of the head was also performed to measure the blood vessel density. Subsequently, CTA of the head and neck was performed with reconstructions.  The radiation dose reduction device was turned on for each scan per the ALARA (As Low as Reasonably Achievable) protocol. .  FINDINGS: CT Perfusion: CBF (<30%) volume: 0 mL Tmax (>6.0s) volume: 0 mL Mismatch volume: 0 mL Mismatch ratio: None  The examination appears to be technically adequate. There is no reduced cerebral blood volume (CBV) or reduced cerebral blood flow (CBF) to suggest an area of acute infarction in a large vessel territory. The cerebral perfusion appears symmetric. No increased mean transit time (MTT) is seen. No areas of mismatch to suggest presence of a penumbra.  CTA head/neck:  Vascular: Normal arch anatomy. Bilateral common carotid arteries are patent without stenosis. Patent carotid bifurcation bilaterally. Cervical and intracranial segments of both internal carotid arteries are patent  without stenosis. Both vertebral arteries have normal subclavian origins. Both vertebral arteries are patent without stenosis. Basilar artery is patent without stenosis. The proximal anterior, middle, and posterior cerebral arteries are patent. The peripheral cerebral branches are symmetric. No aneurysm is demonstrated  Extravascular: No acute abnormality        Impression: 1. No focal area of decreased cerebral blood flow (CBF) is seen to suggest an acute infarct in a large vessel territory.  No defects are seen to suggest a core infarct or an area of reversible ischemia. 2. No carotid or vertebrobasilar occlusion or significant stenosis. No central intracranial arterial occlusion.   These results were communicated by telephone to the stroke team navigator at 7:36 PM on 4/18/2022     This report was finalized on 4/18/2022 7:36 PM by Willi Westbrook.      CT CEREBRAL PERFUSION WITH & WITHOUT CONTRAST    Result Date: 4/18/2022    EXAMINATION: CT CEREBRAL PERFUSION W WO CONTRAST-, CT ANGIOGRAM HEAD W AI ANALYSIS OF LVO-, CT ANGIOGRAM NECK-  DATE OF EXAM: 4/18/2022 6:41 PM  INDICATION: Neuro deficit, acute, stroke suspected.  COMPARISON: None available.  TECHNIQUE: Routine transaxial cuts were obtained through the head without administration of contrast. Routine transaxial cuts were then obtained through the head following the intravenous administration of mL of Isovue 300. Core blood volume, core blood flow, mean transit time, and Tmax images were obtained utilizing the Rapid software protocol. A limited CT angiogram of the head was also performed to measure the blood vessel density. Subsequently, CTA of the head and neck was performed with reconstructions.  The radiation dose reduction device was turned on for each scan per the ALARA (As Low as Reasonably Achievable) protocol. .  FINDINGS: CT Perfusion: CBF (<30%) volume: 0 mL Tmax (>6.0s) volume: 0 mL Mismatch volume: 0 mL Mismatch ratio: None  The examination  appears to be technically adequate. There is no reduced cerebral blood volume (CBV) or reduced cerebral blood flow (CBF) to suggest an area of acute infarction in a large vessel territory. The cerebral perfusion appears symmetric. No increased mean transit time (MTT) is seen. No areas of mismatch to suggest presence of a penumbra.  CTA head/neck:  Vascular: Normal arch anatomy. Bilateral common carotid arteries are patent without stenosis. Patent carotid bifurcation bilaterally. Cervical and intracranial segments of both internal carotid arteries are patent without stenosis. Both vertebral arteries have normal subclavian origins. Both vertebral arteries are patent without stenosis. Basilar artery is patent without stenosis. The proximal anterior, middle, and posterior cerebral arteries are patent. The peripheral cerebral branches are symmetric. No aneurysm is demonstrated  Extravascular: No acute abnormality        Impression: 1. No focal area of decreased cerebral blood flow (CBF) is seen to suggest an acute infarct in a large vessel territory.  No defects are seen to suggest a core infarct or an area of reversible ischemia. 2. No carotid or vertebrobasilar occlusion or significant stenosis. No central intracranial arterial occlusion.   These results were communicated by telephone to the stroke team navigator at 7:36 PM on 4/18/2022     This report was finalized on 4/18/2022 7:36 PM by Willi Westbrook.            Assessment/Plan   Assessment & Plan       Somnolence    Tobacco abuse    Yolanda Cannon is a 37 y.o. female with a history of drug abuse, depression, presents to the ED with headaches, dizziness, nausea, and right-sided weakness.     Assessment and Plan:    Somnolence, right-sided weakness, headache.  R/o TIA  --CT head shows no acute brain ischemia at this time.    --CTA head and neck shows no focal decrease cerebral blood flow to suggest an acute infarct and a large vessel territory.    --CT perfusion  shows no focal area of decreased cerebral blood flow.    --Chest x-ray shows no active cardiopulmonary disease.   -- Consult neurology  -- Aspirin   -- High-dose statin  -- MRI brain and EEG  -- TTE with bubble study  -- PT/OT/SLP/case management consult  -- Neurochecks  -- A.m. labs    History of drug abuse  -- Patient reports being clean since August 2021  -- On buprenorphine    Tobacco abuse  -- Nicotine patch  -- Smoking cessation education      DVT prophylaxis: Mechanical    CODE STATUS:    Level Of Support Discussed With: Patient  Code Status (Patient has no pulse and is not breathing): CPR (Attempt to Resuscitate)  Medical Interventions (Patient has pulse or is breathing): Full Support      This note has been completed as part of a split-shared workflow.  Note initiated by  Signature:  Electronically signed by RAQUEL Singh, 04/18/22, 11:14 PM EDT.     Patient seen and examined with MDM assistance by Yesenia Jay MD 04/19/22 00:50 EDT

## 2022-04-19 NOTE — THERAPY EVALUATION
Patient Name: Yolanda Cannon  : 1985    MRN: [id number]                              Today's Date: 2022       Admit Date: 2022    Visit Dx:     ICD-10-CM ICD-9-CM   1. Facial paresthesia  R20.2 782.0   2. Right sided weakness  R53.1 728.87   3. Cognitive communication deficit  R41.841 799.52     Patient Active Problem List   Diagnosis   • Somnolence   • Tobacco abuse     Past Medical History:   Diagnosis Date   • Anxiety    • Drug abuse (HCC)    • MRSA (methicillin resistant staph aureus) culture positive      Past Surgical History:   Procedure Laterality Date   • SKIN SURGERY      above eyebrow for MRSA      General Information     Row Name 22 1429          Physical Therapy Time and Intention    Document Type evaluation  -AE     Mode of Treatment physical therapy  -AE     Row Name 22 1429          General Information    Patient Profile Reviewed yes  -AE     Prior Level of Function independent:;all household mobility;gait;transfer;bed mobility;ADL's;dressing;bathing  -AE     Existing Precautions/Restrictions other (see comments)  Slight R sided weakness, numbness/tingling  -AE     Barriers to Rehab none identified  -AE     Row Name 22 1429          Living Environment    People in Home significant other;friend(s)  -AE     Row Name 22 1429          Home Main Entrance    Number of Stairs, Main Entrance other (see comments)  13  -AE     Stair Railings, Main Entrance railings safe and in good condition  -AE     Row Name 22 1429          Stairs Within Home, Primary    Number of Stairs, Within Home, Primary none  -AE     Stair Railings, Within Home, Primary none  -AE     Row Name 22 1429          Cognition    Orientation Status (Cognition) oriented x 4  -AE     Row Name 22 1429          Safety Issues, Functional Mobility    Impairments Affecting Function (Mobility) strength  -AE           User Key  (r) = Recorded By, (t) = Taken By, (c) = Cosigned By    Initials  Name Provider Type    AE Jl Mahan, PT Physical Therapist               Mobility     Row Name 04/19/22 1431          Bed Mobility    Comment, (Bed Mobility) Received and left sitting at EOB.  -AE     Row Name 04/19/22 1431          Transfers    Comment, (Transfers) Independent for all transfers. No LOB noted or VCs required.  -AE     Row Name 04/19/22 1431          Sit-Stand Transfer    Sit-Stand [place] (Transfers) independent  -AE     Row Name 04/19/22 1431          Gait/Stairs (Locomotion)    [place] Level (Gait) standby assist  -AE     Distance in Feet (Gait) 450  -AE     Deviations/Abnormal Patterns (Gait) gait speed decreased  -AE     Comment, (Gait/Stairs) Pt demo step through gait pattern with decreased gait speed. Pt demo good functional mobility overall with no LOB noted and no SOA throughout session.  -AE           User Key  (r) = Recorded By, (t) = Taken By, (c) = Cosigned By    Initials Name Provider Type    AE Jl Mahan, PT Physical Therapist               Obj/Interventions     Row Name 04/19/22 1434          Range of Motion Comprehensive    General Range of Motion bilateral lower extremity ROM WFL  -AE     Row Name 04/19/22 1434          Strength Comprehensive (MMT)    Comment, General Manual Muscle Testing (MMT) Assessment RLE grossly 4+/5, LLE 5/5  -AE     Row Name 04/19/22 1434          Balance    Balance Assessment sitting static balance;sitting dynamic balance;sit to stand dynamic balance;standing static balance;standing dynamic balance  -AE     Static Sitting Balance independent  -AE     Dynamic Sitting Balance independent  -AE     Position, Sitting Balance unsupported;sitting edge of bed  -AE     Sit to Stand Dynamic Balance standby assist  -AE     Static Standing Balance standby assist  -AE     Dynamic Standing Balance standby assist  -AE     Position/Device Used, Standing Balance unsupported  -AE     Row Name 04/19/22 1434          Sensory Assessment (Somatosensory)     Sensory Assessment (Somatosensory) right LE  -AE     Right LE Sensory Assessment other (see comments)  Tingling/numbness  -AE           User Key  (r) = Recorded By, (t) = Taken By, (c) = Cosigned By    Initials Name Provider Type    AE Jl Mahan, PT Physical Therapist               Goals/Plan    No documentation.                Clinical Impression     Row Name 04/19/22 1437          Pain    Pretreatment Pain Rating 0/10 - no pain  -AE     Posttreatment Pain Rating 0/10 - no pain  -AE     Row Name 04/19/22 1437          Plan of Care Review    Plan of Care Reviewed With patient  -AE     Progress no change  -AE     Outcome Evaluation PT evaluation completed. Pt presents with numbness/tingling of the R side and slightly decreased strength in RLE compared to LLE. Pt ambulated 450ft with SBA, unsupported and ascended/descended 10 steps step-over-step with SBA. No IP PT needs warranted at this time, will sign off. Recommend D/C home with assist.  -AE     Row Name 04/19/22 1437          Therapy Assessment/Plan (PT)    Patient/Family Therapy Goals Statement (PT) Return home  -AE     Criteria for Skilled Interventions Met (PT) no;no problems identified which require skilled intervention  -AE     Therapy Frequency (PT) evaluation only  -AE     Row Name 04/19/22 1437          Vital Signs    Pre Systolic BP Rehab 108  -AE     Pre Treatment Diastolic BP 69  -AE     Pretreatment Heart Rate (beats/min) 91  -AE     Posttreatment Heart Rate (beats/min) 81  -AE     Pre SpO2 (%) 97  -AE     O2 Delivery Pre Treatment room air  -AE     O2 Delivery Intra Treatment room air  -AE     Post SpO2 (%) 97  -AE     O2 Delivery Post Treatment room air  -AE     Pre Patient Position Supine  -AE     Intra Patient Position Standing  -AE     Post Patient Position Supine  -AE     Row Name 04/19/22 1433          Positioning and Restraints    Pre-Treatment Position in bed  -AE     Post Treatment Position bed  -AE     In Bed notified nsg;sitting  EOB;call light within reach;encouraged to call for assist;with family/caregiver;side rails up x2  -AE           User Key  (r) = Recorded By, (t) = Taken By, (c) = Cosigned By    Initials Name Provider Type    Jl Mcintyre, PT Physical Therapist               Outcome Measures     Row Name 04/19/22 1445 04/19/22 0800       How much help from another person do you currently need...    Turning from your back to your side while in flat bed without using bedrails? 4  -AE 4  -MR    Moving from lying on back to sitting on the side of a flat bed without bedrails? 4  -AE 4  -MR    Moving to and from a bed to a chair (including a wheelchair)? 4  -AE 4  -MR    Standing up from a chair using your arms (e.g., wheelchair, bedside chair)? 4  -AE 4  -MR    Climbing 3-5 steps with a railing? 4  -AE 4  -MR    To walk in hospital room? 4  -AE 4  -MR    AM-PAC 6 Clicks Score (PT) 24  -AE 24  -MR    Row Name 04/19/22 1445 04/19/22 0917       Functional Assessment    Outcome Measure Options AM-PAC 6 Clicks Basic Mobility (PT)  -AE AM-PAC 6 Clicks Daily Activity (OT)  -MRA          User Key  (r) = Recorded By, (t) = Taken By, (c) = Cosigned By    Initials Name Provider Type     Noelle Brody, RN Registered Nurse    Jl Mcintyre, PT Physical Therapist    Suzanne Grider OT Occupational Therapist                             Physical Therapy Education                 Title: PT OT SLP Therapies (Done)     Topic: Physical Therapy (Done)     Point: Mobility training (Done)     Learning Progress Summary           Patient Acceptance, E, VU by AE at 4/19/2022 1340                   Point: Home exercise program (Done)     Learning Progress Summary           Patient Acceptance, E, VU by AE at 4/19/2022 1340                   Point: Body mechanics (Done)     Learning Progress Summary           Patient Acceptance, E, VU by AE at 4/19/2022 1340                   Point: Precautions (Done)     Learning Progress Summary           Patient  Acceptance, E, VU by AE at 4/19/2022 1340                               User Key     Initials Effective Dates Name Provider Type Discipline    AE 09/21/21 -  Jl Mahan PT Physical Therapist PT              PT Recommendation and Plan     Plan of Care Reviewed With: patient  Progress: no change  Outcome Evaluation: PT evaluation completed. Pt presents with numbness/tingling of the R side and slightly decreased strength in RLE compared to LLE. Pt ambulated 450ft with SBA, unsupported and ascended/descended 10 steps step-over-step with SBA. No IP PT needs warranted at this time, will sign off. Recommend D/C home with assist.     Time Calculation:    PT Charges     Row Name 04/19/22 1447             Time Calculation    Start Time 1340  -AE      PT Received On 04/19/22  -AE      PT Goal Re-Cert Due Date 04/29/22  -AE              Untimed Charges    PT Eval/Re-eval Minutes 35  -AE              Total Minutes    Untimed Charges Total Minutes 35  -AE       Total Minutes 35  -AE            User Key  (r) = Recorded By, (t) = Taken By, (c) = Cosigned By    Initials Name Provider Type    AE Jl Mahan PT Physical Therapist              Therapy Charges for Today     Code Description Service Date Service Provider Modifiers Qty    [id number] HC PT EVAL LOW COMPLEXITY 3 4/19/2022 Jl Mahan, PT GP 1          PT G-Codes  Outcome Measure Options: AM-PAC 6 Clicks Basic Mobility (PT)  AM-PAC 6 Clicks Score (PT): 24  AM-PAC 6 Clicks Score (OT): 24    Jl Mahan PT  4/19/2022

## 2022-04-19 NOTE — PLAN OF CARE
Goal Outcome Evaluation:  Plan of Care Reviewed With: patient        Progress: no change  Outcome Evaluation: PT evaluation completed. Pt presents with numbness/tingling of the R side and slightly decreased strength in RLE compared to LLE. Pt ambulated 450ft with SBA, unsupported and ascended/descended 10 steps step-over-step with SBA. No IP PT needs warranted at this time, will sign off. Recommend D/C home with assist.

## 2022-04-19 NOTE — DISCHARGE SUMMARY
[facility] Medicine Services  DISCHARGE SUMMARY    Patient Name: Yolanda Cannon  : 1985  MRN: [id number]    Date of Admission: 2022  6:28 PM  Date of Discharge:  2022  Primary Care Physician: Provider, No Known    Consults     Date and Time Order Name Status Description    2022  9:18 PM Inpatient Neurology Consult Stroke Completed     2022  6:35 PM Inpatient Neurology Consult Stroke Completed           Hospital Course     Presenting Problem:   Somnolence [R40.0]    Active Hospital Problems    Diagnosis  POA   • **Somnolence [R40.0]  Yes   • Tobacco abuse [Z72.0]  Yes      Resolved Hospital Problems   No resolved problems to display.          Hospital Course:  Yolanda Cannon is a 37 y.o. female with a history of drug abuse in recovery, depression.  She presents to the ED with right-sided weakness, numbness, slurred speech, headaches, dizziness nausea with somnolence. Admitted with suspected TIA. CT head, CTA head and neck and CT perfusion and MRI brain were all unremarkable. UDS positive for Suboxone. Echo was negative. Neurology evaluated, their impression was this was likely sec to migraines and peripheral neuropathy, they recommend    magnesium 400 mg daily     Coq 100 mg TID     Riboflavin 400 mg daily     Vitamin B12 2000 mcg  sublingual and folate     Outpatient neurology follow up for peripheral neuropathy     Cognitive behavioral therapy as outpatient.   Above copied from neuro note     History of drug abuse  -Currently in recovery, reports being clean since 2021  -Continue Suboxone     Tobacco abuse  -counseled on cessation    Discharge Follow Up Recommendations for outpatient labs/diagnostics:  F/u with PCP in 1 week  F/u with neurology in 1 month    Day of Discharge     HPI: No acute events overnight, patient is awake, walking in the room, does endorse a headache, denies any weakness    Review of Systems  Gen- No fevers, chills  CV- No chest pain,  palpitations  Resp- No cough, dyspnea  GI- No N/V/D, abd pain      Vital Signs:   Temp:  [97.8 °F (36.6 °C)-99 °F (37.2 °C)] 98.4 °F (36.9 °C)  Heart Rate:  [] 83  Resp:  [16-18] 16  BP: ()/(50-82) 118/76      Physical Exam:  Constitutional: No acute distress, awake, alert  HENT: NCAT, mucous membranes moist  Respiratory: Clear to auscultation bilaterally, respiratory effort normal   Cardiovascular: RRR, no murmurs, rubs, or gallops  Gastrointestinal: Positive bowel sounds, soft, nontender, nondistended  Musculoskeletal: No bilateral ankle edema  Psychiatric: Appropriate affect, cooperative  Neurologic: Oriented x 3, nonfocal  Skin: No rashes    Pertinent  and/or Most Recent Results     LAB RESULTS:      Lab 04/18/22 1847 04/18/22 1846   WBC  --  6.67   HEMOGLOBIN  --  12.4   HEMOGLOBIN, POC 12.6  --    HEMATOCRIT  --  35.8   HEMATOCRIT POC 37*  --    PLATELETS  --  198   NEUTROS ABS  --  4.23   IMMATURE GRANS (ABS)  --  0.01   LYMPHS ABS  --  1.98   MONOS ABS  --  0.38   EOS ABS  --  0.04   MCV  --  87.7   PROTIME  --  11.4*   APTT  --  29.5         Lab 04/19/22 0812 04/18/22 1847   POTASSIUM 4.6  --    CREATININE  --  0.70   EGFR  --  113.0   HEMOGLOBIN A1C 5.10  --          Lab 04/18/22 1846   ALT (SGPT) 15   AST (SGOT) 17         Lab 04/18/22 1846   TROPONIN T <0.010   PROTIME 11.4*   INR 0.9         Lab 04/19/22 0812   CHOLESTEROL 187   LDL CHOL 121*   HDL CHOL 52   TRIGLYCERIDES 74             Brief Urine Lab Results  (Last result in the past 365 days)      Color   Clarity   Blood   Leuk Est   Nitrite   Protein   CREAT   Urine HCG        04/18/22 1923 Yellow   Clear   Negative   Negative   Negative   Negative               Microbiology Results (last 10 days)     Procedure Component Value - Date/Time    COVID PRE-OP / PRE-PROCEDURE SCREENING ORDER (NO ISOLATION) - Swab, Nasopharynx [[id number]]  (Normal) Collected: 04/18/22 1922    Lab Status: Final result Specimen: Swab from Nasopharynx  Updated: 04/18/22 1958    Narrative:      The following orders were created for panel order COVID PRE-OP / PRE-PROCEDURE SCREENING ORDER (NO ISOLATION) - Swab, Nasopharynx.  Procedure                               Abnormality         Status                     ---------                               -----------         ------                     COVID-19 and FLU A/B PCR...[[id number]]  Normal              Final result                 Please view results for these tests on the individual orders.    COVID-19 and FLU A/B PCR - Swab, Nasopharynx [[id number]]  (Normal) Collected: 04/18/22 1922    Lab Status: Final result Specimen: Swab from Nasopharynx Updated: 04/18/22 1958     COVID19 Not Detected     Influenza A PCR Not Detected     Influenza B PCR Not Detected    Narrative:      Fact sheet for providers: https://www.fda.gov/media/445201/download    Fact sheet for patients: https://www.fda.gov/media/110433/download    Test performed by PCR.          Adult Transthoracic Echo Complete W/ Cont if Necessary Per Protocol (With Agitated Saline)    Result Date: 4/19/2022  · Saline test results are negative · Estimated right ventricular systolic pressure from tricuspid regurgitation is normal (<35 mmHg). · Normal LVSF      CT Angiogram Neck    Result Date: 4/18/2022    EXAMINATION: CT CEREBRAL PERFUSION W WO CONTRAST-, CT ANGIOGRAM HEAD W AI ANALYSIS OF LVO-, CT ANGIOGRAM NECK-  DATE OF EXAM: 4/18/2022 6:41 PM  INDICATION: Neuro deficit, acute, stroke suspected.  COMPARISON: None available.  TECHNIQUE: Routine transaxial cuts were obtained through the head without administration of contrast. Routine transaxial cuts were then obtained through the head following the intravenous administration of mL of Isovue 300. Core blood volume, core blood flow, mean transit time, and Tmax images were obtained utilizing the Rapid software protocol. A limited CT angiogram of the head was also performed to measure the blood vessel density.  Subsequently, CTA of the head and neck was performed with reconstructions.  The radiation dose reduction device was turned on for each scan per the ALARA (As Low as Reasonably Achievable) protocol. .  FINDINGS: CT Perfusion: CBF (<30%) volume: 0 mL Tmax (>6.0s) volume: 0 mL Mismatch volume: 0 mL Mismatch ratio: None  The examination appears to be technically adequate. There is no reduced cerebral blood volume (CBV) or reduced cerebral blood flow (CBF) to suggest an area of acute infarction in a large vessel territory. The cerebral perfusion appears symmetric. No increased mean transit time (MTT) is seen. No areas of mismatch to suggest presence of a penumbra.  CTA head/neck:  Vascular: Normal arch anatomy. Bilateral common carotid arteries are patent without stenosis. Patent carotid bifurcation bilaterally. Cervical and intracranial segments of both internal carotid arteries are patent without stenosis. Both vertebral arteries have normal subclavian origins. Both vertebral arteries are patent without stenosis. Basilar artery is patent without stenosis. The proximal anterior, middle, and posterior cerebral arteries are patent. The peripheral cerebral branches are symmetric. No aneurysm is demonstrated  Extravascular: No acute abnormality        1. No focal area of decreased cerebral blood flow (CBF) is seen to suggest an acute infarct in a large vessel territory.  No defects are seen to suggest a core infarct or an area of reversible ischemia. 2. No carotid or vertebrobasilar occlusion or significant stenosis. No central intracranial arterial occlusion.   These results were communicated by telephone to the stroke team navigator at 7:36 PM on 4/18/2022     This report was finalized on 4/18/2022 7:36 PM by Willi Westbrook.      MRI Brain Without Contrast    Result Date: 4/19/2022  MRI BRAIN WO CONTRAST-  Date of Exam: 4/19/2022 12:32 AM  Indication: Stroke, follow up; R20.2-Paresthesia of skin; R53.1-Weakness.   Technique: Routine multiplanar multisequence MR imaging of the brain was performed without the administration of   gadolinium contrast.  Comparison Exams: CT head 4/18/2022  FINDINGS: There are no areas of restricted diffusion.   There is no evidence of intracranial hemorrhage. There is no mass, mass effect, or hydrocephalus. No abnormal extra-axial fluid collections are seen.  Major intracranial vascular flow voids are preserved. Sella and suprasellar cistern are within normal limits.   Cranio-vertebral junction is normal.   Globes and orbits are within normal limits.  Visualized paranasal sinuses and mastoid air cells are clear.       1. No acute intracranial abnormality.  This report was finalized on 4/19/2022 7:35 AM by Juan F Rachel MD.      XR Chest 1 View    Result Date: 4/18/2022  DATE OF EXAM: 4/18/2022 7:11 PM  PROCEDURE: XR CHEST 1 VW-  INDICATIONS: Acute Stroke Protocol (onset < 12 hrs)  COMPARISON: No comparisons available.  TECHNIQUE: Single radiographic AP view of the chest was obtained.  FINDINGS: Heart size and pulmonary vasculature are within normal limits. Lungs clear. Costophrenic angles sharp      No active cardiopulmonary disease  This report was finalized on 4/18/2022 7:56 PM by Willi Westbrook.      CT Head Without Contrast Stroke Protocol    Result Date: 4/18/2022  DATE OF EXAM: 4/18/2022 6:36 PM  PROCEDURE: CT HEAD WO CONTRAST STROKE PROTOCOL-  INDICATIONS: Neuro deficit, acute, stroke suspected  COMPARISON: No comparisons available.  TECHNIQUE: Routine transaxial and coronal reconstruction images were obtained through the head without the administration of contrast. Automated exposure control and iterative reconstruction methods were used.  The radiation dose reduction device was turned on for each scan per the ALARA (As Low as Reasonably Achievable) protocol.  FINDINGS: There is no hemorrhage, edema, or mass effect. CSF spaces are normal. There are no abnormal extra-axial fluid  collections. There is no acute skull abnormality       1. No CT findings of acute brain ischemia at this time 2. No intracranial hemorrhage  Exam time shown is 6:36 PM. Study was reviewed on the workstation and discussed with the stroke team navigator at 7:23 PM on 4/18/2022.  This report was finalized on 4/18/2022 7:23 PM by Willi Westbrook.      CT Angiogram Head w AI Analysis of LVO    Result Date: 4/18/2022    EXAMINATION: CT CEREBRAL PERFUSION W WO CONTRAST-, CT ANGIOGRAM HEAD W AI ANALYSIS OF LVO-, CT ANGIOGRAM NECK-  DATE OF EXAM: 4/18/2022 6:41 PM  INDICATION: Neuro deficit, acute, stroke suspected.  COMPARISON: None available.  TECHNIQUE: Routine transaxial cuts were obtained through the head without administration of contrast. Routine transaxial cuts were then obtained through the head following the intravenous administration of mL of Isovue 300. Core blood volume, core blood flow, mean transit time, and Tmax images were obtained utilizing the Rapid software protocol. A limited CT angiogram of the head was also performed to measure the blood vessel density. Subsequently, CTA of the head and neck was performed with reconstructions.  The radiation dose reduction device was turned on for each scan per the ALARA (As Low as Reasonably Achievable) protocol. .  FINDINGS: CT Perfusion: CBF (<30%) volume: 0 mL Tmax (>6.0s) volume: 0 mL Mismatch volume: 0 mL Mismatch ratio: None  The examination appears to be technically adequate. There is no reduced cerebral blood volume (CBV) or reduced cerebral blood flow (CBF) to suggest an area of acute infarction in a large vessel territory. The cerebral perfusion appears symmetric. No increased mean transit time (MTT) is seen. No areas of mismatch to suggest presence of a penumbra.  CTA head/neck:  Vascular: Normal arch anatomy. Bilateral common carotid arteries are patent without stenosis. Patent carotid bifurcation bilaterally. Cervical and intracranial segments of both  internal carotid arteries are patent without stenosis. Both vertebral arteries have normal subclavian origins. Both vertebral arteries are patent without stenosis. Basilar artery is patent without stenosis. The proximal anterior, middle, and posterior cerebral arteries are patent. The peripheral cerebral branches are symmetric. No aneurysm is demonstrated  Extravascular: No acute abnormality        1. No focal area of decreased cerebral blood flow (CBF) is seen to suggest an acute infarct in a large vessel territory.  No defects are seen to suggest a core infarct or an area of reversible ischemia. 2. No carotid or vertebrobasilar occlusion or significant stenosis. No central intracranial arterial occlusion.   These results were communicated by telephone to the stroke team navigator at 7:36 PM on 4/18/2022     This report was finalized on 4/18/2022 7:36 PM by Willi Westbrook.      CT CEREBRAL PERFUSION WITH & WITHOUT CONTRAST    Result Date: 4/18/2022    EXAMINATION: CT CEREBRAL PERFUSION W WO CONTRAST-, CT ANGIOGRAM HEAD W AI ANALYSIS OF LVO-, CT ANGIOGRAM NECK-  DATE OF EXAM: 4/18/2022 6:41 PM  INDICATION: Neuro deficit, acute, stroke suspected.  COMPARISON: None available.  TECHNIQUE: Routine transaxial cuts were obtained through the head without administration of contrast. Routine transaxial cuts were then obtained through the head following the intravenous administration of mL of Isovue 300. Core blood volume, core blood flow, mean transit time, and Tmax images were obtained utilizing the Rapid software protocol. A limited CT angiogram of the head was also performed to measure the blood vessel density. Subsequently, CTA of the head and neck was performed with reconstructions.  The radiation dose reduction device was turned on for each scan per the ALARA (As Low as Reasonably Achievable) protocol. .  FINDINGS: CT Perfusion: CBF (<30%) volume: 0 mL Tmax (>6.0s) volume: 0 mL Mismatch volume: 0 mL Mismatch ratio:  None  The examination appears to be technically adequate. There is no reduced cerebral blood volume (CBV) or reduced cerebral blood flow (CBF) to suggest an area of acute infarction in a large vessel territory. The cerebral perfusion appears symmetric. No increased mean transit time (MTT) is seen. No areas of mismatch to suggest presence of a penumbra.  CTA head/neck:  Vascular: Normal arch anatomy. Bilateral common carotid arteries are patent without stenosis. Patent carotid bifurcation bilaterally. Cervical and intracranial segments of both internal carotid arteries are patent without stenosis. Both vertebral arteries have normal subclavian origins. Both vertebral arteries are patent without stenosis. Basilar artery is patent without stenosis. The proximal anterior, middle, and posterior cerebral arteries are patent. The peripheral cerebral branches are symmetric. No aneurysm is demonstrated  Extravascular: No acute abnormality        1. No focal area of decreased cerebral blood flow (CBF) is seen to suggest an acute infarct in a large vessel territory.  No defects are seen to suggest a core infarct or an area of reversible ischemia. 2. No carotid or vertebrobasilar occlusion or significant stenosis. No central intracranial arterial occlusion.   These results were communicated by telephone to the stroke team navigator at 7:36 PM on 4/18/2022     This report was finalized on 4/18/2022 7:36 PM by Willi Westbrook.                Results for orders placed during the hospital encounter of 04/18/22    Adult Transthoracic Echo Complete W/ Cont if Necessary Per Protocol (With Agitated Saline)    Interpretation Summary  · Saline test results are negative  · Estimated right ventricular systolic pressure from tricuspid regurgitation is normal (<35 mmHg).  · Normal LVSF      Plan for Follow-up of Pending Labs/Results:     Discharge Details        Discharge Medications      ASK your doctor about these medications       Instructions Start Date   buprenorphine-naloxone 8-2 MG per SL tablet  Commonly known as: SUBOXONE   1 tablet, Sublingual, 2 Times Daily      clonazePAM 0.5 MG tablet  Commonly known as: KlonoPIN   0.5 mg, Oral, 3 Times Daily             No Known Allergies    Discharge Disposition:home    Diet:  Hospital:  Diet Order   Procedures   • Diet Regular       Activity:as tolerated    Restrictions or Other Recommendations:  none       CODE STATUS:    Code Status and Medical Interventions:   Ordered at: 04/18/22 1268     Level Of Support Discussed With:    Patient     Code Status (Patient has no pulse and is not breathing):    CPR (Attempt to Resuscitate)     Medical Interventions (Patient has pulse or is breathing):    Full Support       No future appointments.      Carey Avalos MD  04/19/22      Time Spent on Discharge:  I spent 20  minutes on this discharge activity which included: face-to-face encounter with the patient, reviewing the data in the system, coordination of the care with the nursing staff as well as consultants, documentation, and entering orders.

## 2022-04-19 NOTE — PLAN OF CARE
Goal Outcome Evaluation:  Plan of Care Reviewed With: patient        Progress: no change (Initial Eval)  Outcome Evaluation: OT eval completed. Pt presenting w/ generalized weakness RUE/RLE > LUE/LLE, numbness/tingling, decreased activity tolerance/endurance and impaired mobility. Independent w/ bed mobility, SUP for STS and functional mobility w/o AD, Independent w/ LB dressing. No OT needs assessed or verbalized at this time, OT signing off. Recommendation upon d/c for HWA.

## 2022-04-19 NOTE — PROGRESS NOTES
[facility] Medicine Services  PROGRESS NOTE    Patient Name: Yolanda Cannon  : 1985  MRN: [id number]    Date of Admission: 2022  Primary Care Physician: Provider, No Known    Subjective   Subjective     CC: Follow-up right-sided weakness and headache    HPI: No acute events overnight, patient is awake, walking in the room, does endorse a headache, denies any weakness    ROS:  Gen- No fevers, chills  CV- No chest pain, palpitations  Resp- No cough, dyspnea  GI- No N/V/D, abd pain       Objective   Objective     Vital Signs:   Temp:  [97.8 °F (36.6 °C)-99 °F (37.2 °C)] 98.6 °F (37 °C)  Heart Rate:  [] 92  Resp:  [16] 16  BP: ()/(56-82) 109/71     Physical Exam:  Constitutional: No acute distress, awake, alert  HENT: NCAT, mucous membranes moist  Respiratory: Clear to auscultation bilaterally, respiratory effort normal   Cardiovascular: RRR, no murmurs, rubs, or gallops  Gastrointestinal: Positive bowel sounds, soft, nontender, nondistended  Musculoskeletal: No bilateral ankle edema  Psychiatric: Appropriate affect, cooperative  Neurologic: Oriented x 3, nonfocal  Skin: No rashes      Results Reviewed:  LAB RESULTS:      Lab 22   WBC  --  6.67   HEMOGLOBIN  --  12.4   HEMOGLOBIN, POC 12.6  --    HEMATOCRIT  --  35.8   HEMATOCRIT POC 37*  --    PLATELETS  --  198   NEUTROS ABS  --  4.23   IMMATURE GRANS (ABS)  --  0.01   LYMPHS ABS  --  1.98   MONOS ABS  --  0.38   EOS ABS  --  0.04   MCV  --  87.7   PROTIME  --  11.4*   APTT  --  29.5         Lab 22   CREATININE 0.70   EGFR 113.0         Lab 22   ALT (SGPT) 15   AST (SGOT) 17         Lab 22   TROPONIN T <0.010   PROTIME 11.4*   INR 0.9                 Brief Urine Lab Results  (Last result in the past 365 days)      Color   Clarity   Blood   Leuk Est   Nitrite   Protein   CREAT   Urine HCG        22 Yellow   Clear   Negative   Negative    Negative   Negative                 Microbiology Results Abnormal     Procedure Component Value - Date/Time    COVID PRE-OP / PRE-PROCEDURE SCREENING ORDER (NO ISOLATION) - Swab, Nasopharynx [[id number]]  (Normal) Collected: 04/18/22 1922    Lab Status: Final result Specimen: Swab from Nasopharynx Updated: 04/18/22 1958    Narrative:      The following orders were created for panel order COVID PRE-OP / PRE-PROCEDURE SCREENING ORDER (NO ISOLATION) - Swab, Nasopharynx.  Procedure                               Abnormality         Status                     ---------                               -----------         ------                     COVID-19 and FLU A/B PCR...[[id number]]  Normal              Final result                 Please view results for these tests on the individual orders.    COVID-19 and FLU A/B PCR - Swab, Nasopharynx [[id number]]  (Normal) Collected: 04/18/22 1922    Lab Status: Final result Specimen: Swab from Nasopharynx Updated: 04/18/22 1958     COVID19 Not Detected     Influenza A PCR Not Detected     Influenza B PCR Not Detected    Narrative:      Fact sheet for providers: https://www.fda.gov/media/915074/download    Fact sheet for patients: https://www.fda.gov/media/071401/download    Test performed by PCR.          CT Angiogram Neck    Result Date: 4/18/2022    EXAMINATION: CT CEREBRAL PERFUSION W WO CONTRAST-, CT ANGIOGRAM HEAD W AI ANALYSIS OF LVO-, CT ANGIOGRAM NECK-  DATE OF EXAM: 4/18/2022 6:41 PM  INDICATION: Neuro deficit, acute, stroke suspected.  COMPARISON: None available.  TECHNIQUE: Routine transaxial cuts were obtained through the head without administration of contrast. Routine transaxial cuts were then obtained through the head following the intravenous administration of mL of Isovue 300. Core blood volume, core blood flow, mean transit time, and Tmax images were obtained utilizing the Rapid software protocol. A limited CT angiogram of the head was also performed to measure the  blood vessel density. Subsequently, CTA of the head and neck was performed with reconstructions.  The radiation dose reduction device was turned on for each scan per the ALARA (As Low as Reasonably Achievable) protocol. .  FINDINGS: CT Perfusion: CBF (<30%) volume: 0 mL Tmax (>6.0s) volume: 0 mL Mismatch volume: 0 mL Mismatch ratio: None  The examination appears to be technically adequate. There is no reduced cerebral blood volume (CBV) or reduced cerebral blood flow (CBF) to suggest an area of acute infarction in a large vessel territory. The cerebral perfusion appears symmetric. No increased mean transit time (MTT) is seen. No areas of mismatch to suggest presence of a penumbra.  CTA head/neck:  Vascular: Normal arch anatomy. Bilateral common carotid arteries are patent without stenosis. Patent carotid bifurcation bilaterally. Cervical and intracranial segments of both internal carotid arteries are patent without stenosis. Both vertebral arteries have normal subclavian origins. Both vertebral arteries are patent without stenosis. Basilar artery is patent without stenosis. The proximal anterior, middle, and posterior cerebral arteries are patent. The peripheral cerebral branches are symmetric. No aneurysm is demonstrated  Extravascular: No acute abnormality        Impression: 1. No focal area of decreased cerebral blood flow (CBF) is seen to suggest an acute infarct in a large vessel territory.  No defects are seen to suggest a core infarct or an area of reversible ischemia. 2. No carotid or vertebrobasilar occlusion or significant stenosis. No central intracranial arterial occlusion.   These results were communicated by telephone to the stroke team navigator at 7:36 PM on 4/18/2022     This report was finalized on 4/18/2022 7:36 PM by Willi Westbrook.      MRI Brain Without Contrast    Result Date: 4/19/2022  MRI BRAIN WO CONTRAST-  Date of Exam: 4/19/2022 12:32 AM  Indication: Stroke, follow up; R20.2-Paresthesia  of skin; R53.1-Weakness.  Technique: Routine multiplanar multisequence MR imaging of the brain was performed without the administration of   gadolinium contrast.  Comparison Exams: CT head 4/18/2022  FINDINGS: There are no areas of restricted diffusion.   There is no evidence of intracranial hemorrhage. There is no mass, mass effect, or hydrocephalus. No abnormal extra-axial fluid collections are seen.  Major intracranial vascular flow voids are preserved. Sella and suprasellar cistern are within normal limits.   Cranio-vertebral junction is normal.   Globes and orbits are within normal limits.  Visualized paranasal sinuses and mastoid air cells are clear.      Impression:  1. No acute intracranial abnormality.  This report was finalized on 4/19/2022 7:35 AM by Juan F Rachel MD.      XR Chest 1 View    Result Date: 4/18/2022  DATE OF EXAM: 4/18/2022 7:11 PM  PROCEDURE: XR CHEST 1 VW-  INDICATIONS: Acute Stroke Protocol (onset < 12 hrs)  COMPARISON: No comparisons available.  TECHNIQUE: Single radiographic AP view of the chest was obtained.  FINDINGS: Heart size and pulmonary vasculature are within normal limits. Lungs clear. Costophrenic angles sharp      Impression: No active cardiopulmonary disease  This report was finalized on 4/18/2022 7:56 PM by Willi Westbrook.      CT Head Without Contrast Stroke Protocol    Result Date: 4/18/2022  DATE OF EXAM: 4/18/2022 6:36 PM  PROCEDURE: CT HEAD WO CONTRAST STROKE PROTOCOL-  INDICATIONS: Neuro deficit, acute, stroke suspected  COMPARISON: No comparisons available.  TECHNIQUE: Routine transaxial and coronal reconstruction images were obtained through the head without the administration of contrast. Automated exposure control and iterative reconstruction methods were used.  The radiation dose reduction device was turned on for each scan per the ALARA (As Low as Reasonably Achievable) protocol.  FINDINGS: There is no hemorrhage, edema, or mass effect. CSF spaces are normal.  There are no abnormal extra-axial fluid collections. There is no acute skull abnormality      Impression:  1. No CT findings of acute brain ischemia at this time 2. No intracranial hemorrhage  Exam time shown is 6:36 PM. Study was reviewed on the workstation and discussed with the stroke team navigator at 7:23 PM on 4/18/2022.  This report was finalized on 4/18/2022 7:23 PM by Willi Westbrook.      CT Angiogram Head w AI Analysis of LVO    Result Date: 4/18/2022    EXAMINATION: CT CEREBRAL PERFUSION W WO CONTRAST-, CT ANGIOGRAM HEAD W AI ANALYSIS OF LVO-, CT ANGIOGRAM NECK-  DATE OF EXAM: 4/18/2022 6:41 PM  INDICATION: Neuro deficit, acute, stroke suspected.  COMPARISON: None available.  TECHNIQUE: Routine transaxial cuts were obtained through the head without administration of contrast. Routine transaxial cuts were then obtained through the head following the intravenous administration of mL of Isovue 300. Core blood volume, core blood flow, mean transit time, and Tmax images were obtained utilizing the Rapid software protocol. A limited CT angiogram of the head was also performed to measure the blood vessel density. Subsequently, CTA of the head and neck was performed with reconstructions.  The radiation dose reduction device was turned on for each scan per the ALARA (As Low as Reasonably Achievable) protocol. .  FINDINGS: CT Perfusion: CBF (<30%) volume: 0 mL Tmax (>6.0s) volume: 0 mL Mismatch volume: 0 mL Mismatch ratio: None  The examination appears to be technically adequate. There is no reduced cerebral blood volume (CBV) or reduced cerebral blood flow (CBF) to suggest an area of acute infarction in a large vessel territory. The cerebral perfusion appears symmetric. No increased mean transit time (MTT) is seen. No areas of mismatch to suggest presence of a penumbra.  CTA head/neck:  Vascular: Normal arch anatomy. Bilateral common carotid arteries are patent without stenosis. Patent carotid bifurcation  bilaterally. Cervical and intracranial segments of both internal carotid arteries are patent without stenosis. Both vertebral arteries have normal subclavian origins. Both vertebral arteries are patent without stenosis. Basilar artery is patent without stenosis. The proximal anterior, middle, and posterior cerebral arteries are patent. The peripheral cerebral branches are symmetric. No aneurysm is demonstrated  Extravascular: No acute abnormality        Impression: 1. No focal area of decreased cerebral blood flow (CBF) is seen to suggest an acute infarct in a large vessel territory.  No defects are seen to suggest a core infarct or an area of reversible ischemia. 2. No carotid or vertebrobasilar occlusion or significant stenosis. No central intracranial arterial occlusion.   These results were communicated by telephone to the stroke team navigator at 7:36 PM on 4/18/2022     This report was finalized on 4/18/2022 7:36 PM by Willi Westbrook.      CT CEREBRAL PERFUSION WITH & WITHOUT CONTRAST    Result Date: 4/18/2022    EXAMINATION: CT CEREBRAL PERFUSION W WO CONTRAST-, CT ANGIOGRAM HEAD W AI ANALYSIS OF LVO-, CT ANGIOGRAM NECK-  DATE OF EXAM: 4/18/2022 6:41 PM  INDICATION: Neuro deficit, acute, stroke suspected.  COMPARISON: None available.  TECHNIQUE: Routine transaxial cuts were obtained through the head without administration of contrast. Routine transaxial cuts were then obtained through the head following the intravenous administration of mL of Isovue 300. Core blood volume, core blood flow, mean transit time, and Tmax images were obtained utilizing the Rapid software protocol. A limited CT angiogram of the head was also performed to measure the blood vessel density. Subsequently, CTA of the head and neck was performed with reconstructions.  The radiation dose reduction device was turned on for each scan per the ALARA (As Low as Reasonably Achievable) protocol. .  FINDINGS: CT Perfusion: CBF (<30%) volume: 0 mL  Tmax (>6.0s) volume: 0 mL Mismatch volume: 0 mL Mismatch ratio: None  The examination appears to be technically adequate. There is no reduced cerebral blood volume (CBV) or reduced cerebral blood flow (CBF) to suggest an area of acute infarction in a large vessel territory. The cerebral perfusion appears symmetric. No increased mean transit time (MTT) is seen. No areas of mismatch to suggest presence of a penumbra.  CTA head/neck:  Vascular: Normal arch anatomy. Bilateral common carotid arteries are patent without stenosis. Patent carotid bifurcation bilaterally. Cervical and intracranial segments of both internal carotid arteries are patent without stenosis. Both vertebral arteries have normal subclavian origins. Both vertebral arteries are patent without stenosis. Basilar artery is patent without stenosis. The proximal anterior, middle, and posterior cerebral arteries are patent. The peripheral cerebral branches are symmetric. No aneurysm is demonstrated  Extravascular: No acute abnormality        Impression: 1. No focal area of decreased cerebral blood flow (CBF) is seen to suggest an acute infarct in a large vessel territory.  No defects are seen to suggest a core infarct or an area of reversible ischemia. 2. No carotid or vertebrobasilar occlusion or significant stenosis. No central intracranial arterial occlusion.   These results were communicated by telephone to the stroke team navigator at 7:36 PM on 4/18/2022     This report was finalized on 4/18/2022 7:36 PM by Willi Westbrook.            I have reviewed the medications:  Scheduled Meds:aspirin, 81 mg, Oral, Daily   Or  aspirin, 300 mg, Rectal, Daily  atorvastatin, 80 mg, Oral, Nightly  buprenorphine-naloxone, 1 tablet, Sublingual, BID  clonazePAM, 0.5 mg, Oral, TID  nicotine, 1 patch, Transdermal, Q24H  sodium chloride, 10 mL, Intravenous, Q12H      Continuous Infusions:   PRN Meds:.LORazepam  •  potassium chloride **OR** potassium chloride **OR**  potassium chloride  •  sodium chloride  •  sodium chloride    Assessment/Plan   Assessment & Plan     Active Hospital Problems    Diagnosis  POA   • **Somnolence [R40.0]  Yes   • Tobacco abuse [Z72.0]  Yes      Resolved Hospital Problems   No resolved problems to display.        Brief Hospital Course to date:  Yolanda Cannon is a 37 y.o. female with a history of drug abuse in recovery, depression.  She presents to the ED with right-sided weakness, numbness, slurred speech, headaches, dizziness nausea with somnolence    Suspected TIA   -Patient presenting with right-sided weakness, slurred speech, headaches and somnolence,  -CT head, CTA head and neck and CT perfusion and MRI brain are all unremarkable  -UDS positive for Suboxone  -Follow-up echo, EEG, neurology has been consulted to assist  -Continue aspirin and statin  -PT/OT to evaluate    History of drug abuse  -Currently in recovery, reports being clean since 8/2021  -Continue Suboxone    Tobacco abuse  -ContinueNRT    DVT prophylaxis:  Mechanical DVT prophylaxis orders are present.       AM-PAC 6 Clicks Score (PT): 24 (04/18/22 2111)    Disposition: TBD    CODE STATUS:   Code Status and Medical Interventions:   Ordered at: 04/18/22 1724     Level Of Support Discussed With:    Patient     Code Status (Patient has no pulse and is not breathing):    CPR (Attempt to Resuscitate)     Medical Interventions (Patient has pulse or is breathing):    Full Support       Carey Avalos MD  04/19/22

## 2022-04-19 NOTE — PROGRESS NOTES
Stroke Progress Note       Chief Complaint: paresthesias.    Subjective    Subjective     Subjective:  No acute events overnight   Year right foot swollen  Numbness to the right hand and right foot a week ago   Significant stress lately  At work headache, nausea and sent her home to go to urgent care.       Review of Systems   Constitutional: No fatigue  HENT: Negative for nosebleeds and rhinorrhea.    Eyes: Negative for redness.   Respiratory: Negative for cough.    Gastrointestinal: Negative for anal bleeding.   Endocrine: Negative for polydipsia.   Genitourinary: Negative for enuresis and urgency.   Musculoskeletal: right foot swelling.  Neurological: Negative for tremors.   Psychiatric/Behavioral: Negative for hallucinations.     Objective      Temp:  [97.8 °F (36.6 °C)-99 °F (37.2 °C)] 98.4 °F (36.9 °C)  Heart Rate:  [] 83  Resp:  [16-18] 16  BP: ()/(50-82) 98/50        NEURO( Exam is performed with help of hospital staff at bed side and observed by me via audio-video interface)    MENTAL STATUS: AAOx3, memory intact, fund of knowledge appropriate    LANG/SPEECH: Naming and repetition intact, fluent, follows 3-step commands    CRANIAL NERVES:    Pupils equal and reactive, EOMI intact, no gaze deviation, no nystagmus  No facial droop, cough and gag intact, shoulder shrug intact, tongue midline     MOTOR:  Moves all extremities equally    SENSORY: Normal to light touch all throughout     COORDINATION: Normal finger to nose and heel to shin, no tremor, no dysmetria    STATION: Not assessed due to patient condition    GAIT: Not assessed due to patient condition      Results Review:    I reviewed the patient's new clinical results.    Lab Results (last 24 hours)     Procedure Component Value Units Date/Time    POC Glucose Once [[id number]]  (Normal) Collected: 04/19/22 1225    Specimen: Blood Updated: 04/19/22 1231     Glucose 101 mg/dL      Comment: Meter: [id number] : [id number] Ronal Whitney        Hemoglobin A1c [[id number]]  (Normal) Collected: 04/19/22 0812    Specimen: Blood Updated: 04/19/22 0935     Hemoglobin A1C 5.10 %     Narrative:      Hemoglobin A1C Ranges:    Increased Risk for Diabetes  5.7% to 6.4%  Diabetes                     >= 6.5%  Diabetic Goal                < 7.0%    Lipid Panel [[id number]]  (Abnormal) Collected: 04/19/22 0812    Specimen: Blood Updated: 04/19/22 0844     Total Cholesterol 187 mg/dL      Triglycerides 74 mg/dL      HDL Cholesterol 52 mg/dL      LDL Cholesterol  121 mg/dL      VLDL Cholesterol 14 mg/dL      LDL/HDL Ratio 2.31    Narrative:      Cholesterol Reference Ranges  (U.S. Department of Health and Human Services ATP III Classifications)    Desirable          <200 mg/dL  Borderline High    200-239 mg/dL  High Risk          >240 mg/dL      Triglyceride Reference Ranges  (U.S. Department of Health and Human Services ATP III Classifications)    Normal           <150 mg/dL  Borderline High  150-199 mg/dL  High             200-499 mg/dL  Very High        >500 mg/dL    HDL Reference Ranges  (U.S. Department of Health and Human Services ATP III Classifications)    Low     <40 mg/dl (major risk factor for CHD)  High    >60 mg/dl ('negative' risk factor for CHD)        LDL Reference Ranges  (U.S. Department of Health and Human Services ATP III Classifications)    Optimal          <100 mg/dL  Near Optimal     100-129 mg/dL  Borderline High  130-159 mg/dL  High             160-189 mg/dL  Very High        >189 mg/dL    Potassium [[id number]]  (Normal) Collected: 04/19/22 0812    Specimen: Blood Updated: 04/19/22 0844     Potassium 4.6 mmol/L     POC Glucose Once [[id number]]  (Normal) Collected: 04/19/22 0543    Specimen: Blood Updated: 04/19/22 0544     Glucose 122 mg/dL      Comment: Meter: [id number] : [id number] Aby García       POC Glucose Once [[id number]]  (Abnormal) Collected: 04/19/22 0007    Specimen: Blood Updated: 04/19/22 0008     Glucose 150 mg/dL      Comment:  Meter: [id number] : [id number] Aby García       [place] Draw [[id number]] Collected: 04/18/22 1846    Specimen: Blood Updated: 04/18/22 2302    Narrative:      The following orders were created for panel order [place] Draw.  Procedure                               Abnormality         Status                     ---------                               -----------         ------                     Green Top (Gel)[[id number]]                                  Final result               Lavender Top[[id number]]                                     Final result               Gold Top - SST[[id number]]                                   Final result               Castelan Top[[id number]]                                         Final result               Light Blue Top[[id number]]                                   Final result                 Please view results for these tests on the individual orders.    Castelan Top [[id number]] Collected: 04/18/22 1846    Specimen: Blood Updated: 04/18/22 2302     Extra Tube Hold for add-ons.     Comment: Auto resulted.       POC Protime / INR [[id number]]  (Abnormal) Collected: 04/18/22 1846    Specimen: Blood Updated: 04/18/22 2211     Protime 11.4 seconds      INR 0.9     Comment: Serial Number: [id number]:  [id number]       Green Top (Gel) [[id number]] Collected: 04/18/22 1846    Specimen: Blood Updated: 04/18/22 2003     Extra Tube Hold for add-ons.     Comment: Auto resulted.       Gold Top - SST [[id number]] Collected: 04/18/22 1846    Specimen: Blood Updated: 04/18/22 2003     Extra Tube Hold for add-ons.     Comment: Auto resulted.       Lavender Top [[id number]] Collected: 04/18/22 1846    Specimen: Blood Updated: 04/18/22 2003     Extra Tube hold for add-on     Comment: Auto resulted       Light Blue Top [[id number]] Collected: 04/18/22 1846    Specimen: Blood Updated: 04/18/22 2002     Extra Tube hold for add-on     Comment: Auto resulted       COVID PRE-OP / PRE-PROCEDURE SCREENING ORDER (NO  ISOLATION) - Swab, Nasopharynx [[id number]]  (Normal) Collected: 04/18/22 1922    Specimen: Swab from Nasopharynx Updated: 04/18/22 1958    Narrative:      The following orders were created for panel order COVID PRE-OP / PRE-PROCEDURE SCREENING ORDER (NO ISOLATION) - Swab, Nasopharynx.  Procedure                               Abnormality         Status                     ---------                               -----------         ------                     COVID-19 and FLU A/B PCR...[[id number]]  Normal              Final result                 Please view results for these tests on the individual orders.    COVID-19 and FLU A/B PCR - Swab, Nasopharynx [[id number]]  (Normal) Collected: 04/18/22 1922    Specimen: Swab from Nasopharynx Updated: 04/18/22 1958     COVID19 Not Detected     Influenza A PCR Not Detected     Influenza B PCR Not Detected    Narrative:      Fact sheet for providers: https://www.fda.gov/media/887316/download    Fact sheet for patients: https://www.fda.gov/media/483238/download    Test performed by PCR.    Urine Drug Screen - Urine, Clean Catch [[id number]]  (Abnormal) Collected: 04/18/22 1923    Specimen: Urine, Clean Catch Updated: 04/18/22 1946     THC, Screen, Urine Negative     Phencyclidine (PCP), Urine Negative     Cocaine Screen, Urine Negative     Methamphetamine, Ur Negative     Opiate Screen Negative     Amphetamine Screen, Urine Negative     Benzodiazepine Screen, Urine Negative     Tricyclic Antidepressants Screen Negative     Methadone Screen, Urine Negative     Barbiturates Screen, Urine Negative     Oxycodone Screen, Urine Negative     Propoxyphene Screen Negative     Buprenorphine, Screen, Urine Positive    Narrative:      Cutoff For Drugs Screened:    Amphetamines               500 ng/ml  Barbiturates               200 ng/ml  Benzodiazepines            150 ng/ml  Cocaine                    150 ng/ml  Methadone                  200 ng/ml  Opiates                    100  ng/ml  Phencyclidine               25 ng/ml  THC                            50 ng/ml  Methamphetamine            500 ng/ml  Tricyclic Antidepressants  300 ng/ml  Oxycodone                  100 ng/ml  Propoxyphene               300 ng/ml  Buprenorphine               10 ng/ml    The normal value for all drugs tested is negative. This report includes unconfirmed screening results, with the cutoff values listed, to be used for medical treatment purposes only.  Unconfirmed results must not be used for non-medical purposes such as employment or legal testing.  Clinical consideration should be applied to any drug of abuse test, particularly when unconfirmed results are used.      Urinalysis With Microscopic If Indicated (No Culture) - Urine, Clean Catch [[id number]]  (Abnormal) Collected: 04/18/22 1923    Specimen: Urine, Clean Catch Updated: 04/18/22 1937     Color, UA Yellow     Appearance, UA Clear     pH, UA 7.5     Specific Gravity, UA 1.054     Glucose, UA Negative     Ketones, UA Negative     Bilirubin, UA Negative     Blood, UA Negative     Protein, UA Negative     Leuk Esterase, UA Negative     Nitrite, UA Negative     Urobilinogen, UA 0.2 E.U./dL    Narrative:      Urine microscopic not indicated.    AST [[id number]]  (Normal) Collected: 04/18/22 1846    Specimen: Blood Updated: 04/18/22 1935     AST (SGOT) 17 U/L     Troponin [[id number]]  (Normal) Collected: 04/18/22 1846    Specimen: Blood Updated: 04/18/22 1934     Troponin T <0.010 ng/mL     Narrative:      Troponin T Reference Range:  <= 0.03 ng/mL-   Negative for AMI  >0.03 ng/mL-     Abnormal for myocardial necrosis.  Clinicians would have to utilize clinical acumen, EKG, Troponin and serial changes to determine if it is an Acute Myocardial Infarction or myocardial injury due to an underlying chronic condition.       Results may be falsely decreased if patient taking Biotin.      Ethanol [[id number]]  (Normal) Collected: 04/18/22 1846    Specimen: Blood  Updated: 04/18/22 1934     Ethanol <10 mg/dL     Narrative:      Elevated lactic acid concentration and lactate dehydrogenase(LD) activity may falsely elevate enzymatically determined ethanol levels. Not for legal purposes.     ALT [[id number]]  (Normal) Collected: 04/18/22 1846    Specimen: Blood Updated: 04/18/22 1934     ALT (SGPT) 15 U/L     aPTT [[id number]]  (Normal) Collected: 04/18/22 1846    Specimen: Blood Updated: 04/18/22 1934     PTT 29.5 seconds     Narrative:      PTT = The equivalent PTT values for the therapeutic range of heparin levels at 0.3 to 0.5 U/ml are 55 to 70 seconds.    CBC & Differential [[id number]]  (Abnormal) Collected: 04/18/22 1846    Specimen: Blood Updated: 04/18/22 1909    Narrative:      The following orders were created for panel order CBC & Differential.  Procedure                               Abnormality         Status                     ---------                               -----------         ------                     CBC Auto Differential[[id number]]        Abnormal            Final result                 Please view results for these tests on the individual orders.    CBC Auto Differential [[id number]]  (Abnormal) Collected: 04/18/22 1846    Specimen: Blood Updated: 04/18/22 1909     WBC 6.67 10*3/mm3      RBC 4.08 10*6/mm3      Hemoglobin 12.4 g/dL      Hematocrit 35.8 %      MCV 87.7 fL      MCH 30.4 pg      MCHC 34.6 g/dL      RDW 12.0 %      RDW-SD 38.8 fl      MPV 10.2 fL      Platelets 198 10*3/mm3      Neutrophil % 63.5 %      Lymphocyte % 29.7 %      Monocyte % 5.7 %      Eosinophil % 0.6 %      Basophil % 0.4 %      Immature Grans % 0.1 %      Neutrophils, Absolute 4.23 10*3/mm3      Lymphocytes, Absolute 1.98 10*3/mm3      Monocytes, Absolute 0.38 10*3/mm3      Eosinophils, Absolute 0.04 10*3/mm3      Basophils, Absolute 0.03 10*3/mm3      Immature Grans, Absolute 0.01 10*3/mm3      nRBC 0.0 /100 WBC     POC CHEM 8 [[id number]]  (Abnormal) Collected: 04/18/22 1847     Specimen: Blood Updated: 04/18/22 1849     Glucose 90 mg/dL      BUN 14 mg/dL      Creatinine 0.70 mg/dL      Sodium 139 mmol/L      POC Potassium 3.5 mmol/L      Chloride 104 mmol/L      Total CO2 26 mmol/L      Hemoglobin 12.6 g/dL      Comment: Serial Number: [id number]:  [id number]        Hematocrit 37 %      Ionized Calcium 1.23 mmol/L      eGFR 113.0 mL/min/1.73         CT Angiogram Neck    Result Date: 4/18/2022  1. No focal area of decreased cerebral blood flow (CBF) is seen to suggest an acute infarct in a large vessel territory.  No defects are seen to suggest a core infarct or an area of reversible ischemia. 2. No carotid or vertebrobasilar occlusion or significant stenosis. No central intracranial arterial occlusion.   These results were communicated by telephone to the stroke team navigator at 7:36 PM on 4/18/2022     This report was finalized on 4/18/2022 7:36 PM by Willi Westbrook.      MRI Brain Without Contrast    Result Date: 4/19/2022   1. No acute intracranial abnormality.  This report was finalized on 4/19/2022 7:35 AM by Juan F Rachel MD.      XR Chest 1 View    Result Date: 4/18/2022  No active cardiopulmonary disease  This report was finalized on 4/18/2022 7:56 PM by Willi Westbrook.      CT Head Without Contrast Stroke Protocol    Result Date: 4/18/2022   1. No CT findings of acute brain ischemia at this time 2. No intracranial hemorrhage  Exam time shown is 6:36 PM. Study was reviewed on the workstation and discussed with the stroke team navigator at 7:23 PM on 4/18/2022.  This report was finalized on 4/18/2022 7:23 PM by Willi Westbrook.      CT Angiogram Head w AI Analysis of LVO    Result Date: 4/18/2022  1. No focal area of decreased cerebral blood flow (CBF) is seen to suggest an acute infarct in a large vessel territory.  No defects are seen to suggest a core infarct or an area of reversible ischemia. 2. No carotid or vertebrobasilar occlusion or significant stenosis. No central  intracranial arterial occlusion.   These results were communicated by telephone to the stroke team navigator at 7:36 PM on 4/18/2022     This report was finalized on 4/18/2022 7:36 PM by Willi Westbrook.      CT CEREBRAL PERFUSION WITH & WITHOUT CONTRAST    Result Date: 4/18/2022  1. No focal area of decreased cerebral blood flow (CBF) is seen to suggest an acute infarct in a large vessel territory.  No defects are seen to suggest a core infarct or an area of reversible ischemia. 2. No carotid or vertebrobasilar occlusion or significant stenosis. No central intracranial arterial occlusion.   These results were communicated by telephone to the stroke team navigator at 7:36 PM on 4/18/2022     This report was finalized on 4/18/2022 7:36 PM by Willi Westbrook.      Results for orders placed during the hospital encounter of 04/18/22    Adult Transthoracic Echo Complete W/ Cont if Necessary Per Protocol (With Agitated Saline)    Interpretation Summary  · Saline test results are negative  · Estimated right ventricular systolic pressure from tricuspid regurgitation is normal (<35 mmHg).  · Normal LVSF            Assessment/Plan     Assessment/Plan:   This is 37 year old RHWF with h/o tobacco abuse, on subaxone therapy presented with one week onset of right foot and hand paresthesias. Typically in stock- glove distribution only on the right. Rest of the neurological exam is normal.     She complains of migraine headaches and was nausea yesterday for which she came to the ED. Acute stroke imaging  CT head, CTA head and Neck, CT perfusion which was unremarkable. Less likely this is an ischemic vascular event of the brain.     1. Migraine, unspecified  2. Peripheral Neuropathy      Plan  magnesium 400 mg daily   Coq 100 mg TID   Riboflavin 400 mg daily   Vitamin B12 2000 mcg  sublingual and folate   Outpatient neurology follow up for peripheral neuropathy   Cognitive behavioral therapy as outpatient.     Patient can be  discharged from neurology stand point.         Eduin Hobbs MD  04/19/22  17:03 EDT

## 2022-04-19 NOTE — PLAN OF CARE
Problem: Adult Inpatient Plan of Care  Goal: Plan of Care Review  Outcome: Ongoing, Progressing  Goal: Patient-Specific Goal (Individualized)  Outcome: Ongoing, Progressing  Goal: Absence of Hospital-Acquired Illness or Injury  Outcome: Ongoing, Progressing  Intervention: Identify and Manage Fall Risk  Recent Flowsheet Documentation  Taken 4/19/2022 1600 by Noelle Brody RN  Safety Promotion/Fall Prevention:   activity supervised   assistive device/personal items within reach   clutter free environment maintained   fall prevention program maintained   nonskid shoes/slippers when out of bed   room organization consistent   safety round/check completed  Taken 4/19/2022 1400 by Noelle Brody RN  Safety Promotion/Fall Prevention:   activity supervised   assistive device/personal items within reach   clutter free environment maintained   fall prevention program maintained   nonskid shoes/slippers when out of bed   room organization consistent   safety round/check completed  Taken 4/19/2022 1000 by Noelle Brody RN  Safety Promotion/Fall Prevention:   activity supervised   assistive device/personal items within reach   clutter free environment maintained   fall prevention program maintained   nonskid shoes/slippers when out of bed   room organization consistent   safety round/check completed  Taken 4/19/2022 0800 by Noelle Brody RN  Safety Promotion/Fall Prevention:   activity supervised   assistive device/personal items within reach   clutter free environment maintained   fall prevention program maintained   nonskid shoes/slippers when out of bed   room organization consistent   safety round/check completed  Intervention: Prevent Skin Injury  Recent Flowsheet Documentation  Taken 4/19/2022 1600 by Noelle Brody RN  Body Position: position changed independently  Taken 4/19/2022 1400 by Noelle Brody RN  Body Position: position changed independently  Taken 4/19/2022 1000 by Noelle Brody RN  Body Position: position  changed independently  Taken 4/19/2022 0800 by Noelle Brody RN  Body Position: position changed independently  Intervention: Prevent and Manage VTE (Venous Thromboembolism) Risk  Recent Flowsheet Documentation  Taken 4/19/2022 1600 by Noelle Brody, RN  Activity Management: activity adjusted per tolerance  Taken 4/19/2022 1400 by Noelle Brody RN  Activity Management: activity adjusted per tolerance  Taken 4/19/2022 1000 by Noelle Brody RN  Activity Management: activity adjusted per tolerance  Taken 4/19/2022 0800 by Noelle Brody RN  Activity Management: activity adjusted per tolerance  Goal: Optimal Comfort and Wellbeing  Outcome: Ongoing, Progressing  Goal: Readiness for Transition of Care  Outcome: Ongoing, Progressing     Problem: Pain Acute  Goal: Acceptable Pain Control and Functional Ability  Outcome: Ongoing, Progressing   Goal Outcome Evaluation:

## 2022-04-19 NOTE — PLAN OF CARE
Problem: Adult Inpatient Plan of Care  Goal: Plan of Care Review  4/19/2022 1843 by Noelle Brody RN  Outcome: Met  4/19/2022 1253 by Noelle Brody RN  Outcome: Ongoing, Progressing  Goal: Patient-Specific Goal (Individualized)  4/19/2022 1843 by Noelle Brody RN  Outcome: Met  4/19/2022 1253 by Noelle Brody RN  Outcome: Ongoing, Progressing  Goal: Absence of Hospital-Acquired Illness or Injury  4/19/2022 1843 by Noelle Brody RN  Outcome: Met  4/19/2022 1253 by Noelle Brody RN  Outcome: Ongoing, Progressing  Intervention: Identify and Manage Fall Risk  Recent Flowsheet Documentation  Taken 4/19/2022 1600 by Noelle Brody RN  Safety Promotion/Fall Prevention:   activity supervised   assistive device/personal items within reach   clutter free environment maintained   fall prevention program maintained   nonskid shoes/slippers when out of bed   room organization consistent   safety round/check completed  Taken 4/19/2022 1400 by Noelle Brody RN  Safety Promotion/Fall Prevention:   activity supervised   assistive device/personal items within reach   clutter free environment maintained   fall prevention program maintained   nonskid shoes/slippers when out of bed   room organization consistent   safety round/check completed  Taken 4/19/2022 1000 by Noelle Brody RN  Safety Promotion/Fall Prevention:   activity supervised   assistive device/personal items within reach   clutter free environment maintained   fall prevention program maintained   nonskid shoes/slippers when out of bed   room organization consistent   safety round/check completed  Taken 4/19/2022 0800 by Noelle Brody RN  Safety Promotion/Fall Prevention:   activity supervised   assistive device/personal items within reach   clutter free environment maintained   fall prevention program maintained   nonskid shoes/slippers when out of bed   room organization consistent   safety round/check completed  Intervention: Prevent Skin Injury  Recent Flowsheet  Documentation  Taken 4/19/2022 1600 by Noelle Brody RN  Body Position: position changed independently  Taken 4/19/2022 1400 by Noelle Brody RN  Body Position: position changed independently  Taken 4/19/2022 1000 by Noelle Brody RN  Body Position: position changed independently  Taken 4/19/2022 0800 by Noelle Brody RN  Body Position: position changed independently  Intervention: Prevent and Manage VTE (Venous Thromboembolism) Risk  Recent Flowsheet Documentation  Taken 4/19/2022 1600 by Noelle Brody RN  Activity Management: activity adjusted per tolerance  Taken 4/19/2022 1400 by Noelle Brody RN  Activity Management: activity adjusted per tolerance  Taken 4/19/2022 1000 by Noelle Brody RN  Activity Management: activity adjusted per tolerance  Taken 4/19/2022 0800 by Noelle Brody RN  Activity Management: activity adjusted per tolerance  Goal: Optimal Comfort and Wellbeing  4/19/2022 1843 by Noelle Brody RN  Outcome: Met  4/19/2022 1253 by Noelle Brody RN  Outcome: Ongoing, Progressing  Goal: Readiness for Transition of Care  4/19/2022 1843 by Noelle Brody RN  Outcome: Met  4/19/2022 1253 by Noelle Brody RN  Outcome: Ongoing, Progressing     Problem: Pain Acute  Goal: Acceptable Pain Control and Functional Ability  4/19/2022 1843 by Noelle Brody RN  Outcome: Met  4/19/2022 1253 by Noelle Broyd RN  Outcome: Ongoing, Progressing   Goal Outcome Evaluation:

## 2022-04-19 NOTE — PLAN OF CARE
Problem: Adult Inpatient Plan of Care  Goal: Plan of Care Review  Outcome: Ongoing, Progressing  Flowsheets (Taken 4/19/2022 0333)  Progress: improving  Plan of Care Reviewed With: patient  Outcome Evaluation: Vss on room air. Pt. came from ed with possible stoke symptoms related to the right side weakness.  Pt. was able to pass dysphagia swallow test.  Goal: Patient-Specific Goal (Individualized)  Outcome: Ongoing, Progressing  Goal: Absence of Hospital-Acquired Illness or Injury  Outcome: Ongoing, Progressing  Intervention: Identify and Manage Fall Risk  Recent Flowsheet Documentation  Taken 4/19/2022 0200 by Sha Arboleda RN  Safety Promotion/Fall Prevention:   activity supervised   assistive device/personal items within reach   clutter free environment maintained   toileting scheduled   safety round/check completed   room organization consistent   nonskid shoes/slippers when out of bed   lighting adjusted   fall prevention program maintained  Taken 4/19/2022 0000 by Sha Arboleda RN  Safety Promotion/Fall Prevention:   activity supervised   toileting scheduled   safety round/check completed   room organization consistent   nonskid shoes/slippers when out of bed   lighting adjusted   fall prevention program maintained   assistive device/personal items within reach   clutter free environment maintained  Taken 4/18/2022 2200 by Sha Arboleda RN  Safety Promotion/Fall Prevention:   activity supervised   assistive device/personal items within reach   clutter free environment maintained   toileting scheduled   safety round/check completed   room organization consistent   nonskid shoes/slippers when out of bed   fall prevention program maintained   lighting adjusted  Intervention: Prevent Skin Injury  Recent Flowsheet Documentation  Taken 4/19/2022 0200 by Sha Arboleda RN  Body Position: position changed independently  Taken 4/19/2022 0000 by Sha Arboleda RN  Body Position: position changed  independently  Taken 4/18/2022 2200 by Sha Arboleda RN  Body Position: position changed independently  Taken 4/18/2022 2111 by Sha Arboleda RN  Skin Protection:   adhesive use limited   transparent dressing maintained  Intervention: Prevent and Manage VTE (Venous Thromboembolism) Risk  Recent Flowsheet Documentation  Taken 4/19/2022 0200 by Sha Arboleda RN  Activity Management: activity adjusted per tolerance  Taken 4/19/2022 0000 by Sha Arboleda RN  Activity Management: activity adjusted per tolerance  Taken 4/18/2022 2200 by Sha Arboleda RN  Activity Management: activity adjusted per tolerance  Taken 4/18/2022 2111 by Sha Arboleda RN  Activity Management: activity adjusted per tolerance  VTE Prevention/Management:   bilateral   dorsiflexion/plantar flexion performed   bleeding risk factor(s) identified  Range of Motion: active ROM (range of motion) encouraged  Intervention: Prevent Infection  Recent Flowsheet Documentation  Taken 4/19/2022 0200 by Sha Arboleda RN  Infection Prevention:   visitors restricted/screened   single patient room provided   rest/sleep promoted   personal protective equipment utilized   hand hygiene promoted   equipment surfaces disinfected  Taken 4/19/2022 0000 by Sha Arboleda RN  Infection Prevention:   visitors restricted/screened   single patient room provided   rest/sleep promoted   personal protective equipment utilized   hand hygiene promoted   equipment surfaces disinfected  Taken 4/18/2022 2200 by Sha Arboleda RN  Infection Prevention:   visitors restricted/screened   single patient room provided   rest/sleep promoted   personal protective equipment utilized   hand hygiene promoted   equipment surfaces disinfected  Taken 4/18/2022 2111 by Sha Arboleda RN  Infection Prevention:   visitors restricted/screened   single patient room provided   rest/sleep promoted   personal protective equipment utilized   hand hygiene promoted   equipment surfaces  disinfected  Goal: Optimal Comfort and Wellbeing  Outcome: Ongoing, Progressing  Intervention: Monitor Pain and Promote Comfort  Recent Flowsheet Documentation  Taken 4/19/2022 0200 by Sha Arboleda RN  Pain Management Interventions:   quiet environment facilitated   position adjusted   pillow support provided  Taken 4/19/2022 0000 by Sha Arboleda RN  Pain Management Interventions:   quiet environment facilitated   pillow support provided   position adjusted  Taken 4/18/2022 2200 by Sha Arboleda RN  Pain Management Interventions:   quiet environment facilitated   position adjusted   pillow support provided  Taken 4/18/2022 2111 by Sha Arboleda RN  Pain Management Interventions:   quiet environment facilitated   position adjusted   pillow support provided  Intervention: Provide Person-Centered Care  Recent Flowsheet Documentation  Taken 4/18/2022 2111 by Sha Arboleda RN  Trust Relationship/Rapport:   care explained   questions answered  Goal: Readiness for Transition of Care  Outcome: Ongoing, Progressing  Intervention: Mutually Develop Transition Plan  Recent Flowsheet Documentation  Taken 4/18/2022 2151 by Sha Arboleda RN  Equipment Currently Used at Home: none  Transportation Anticipated: family or friend will provide  Patient/Family Anticipated Services at Transition: none  Patient/Family Anticipates Transition to: home with family   Goal Outcome Evaluation:  Plan of Care Reviewed With: patient        Progress: improving  Outcome Evaluation: Vss on room air. Pt. came from ed with possible stoke symptoms related to the right side weakness.  Pt. was able to pass dysphagia swallow test.

## 2022-04-19 NOTE — CONSULTS
Diabetes Education    Patient Name:  Yolanda Cannon  YOB: 1985  MRN: [id number]  Admit Date:  4/18/2022        Orders received for Diabetes Education per [facility] Stroke Protocol. Chart reviewed. Ms. Cannon has no history of diabetes on her chart, no PTA medications, and her A1c is 5.1%. Therefore, she does not qualify for the follow up stroke class based on these exclusion criteria. Thank you for the referral. Please re-consult if further needs arise.       Electronically signed by:  Connie Huitron RN EXTENDER  04/19/22 09:40 EDT

## 2022-04-19 NOTE — PLAN OF CARE
Goal Outcome Evaluation:  Plan of Care Reviewed With: patient        Progress: no change   SLP evaluation completed. Will address cognitive deficits in tx. Please see note for further details and recommendations.

## 2022-04-19 NOTE — THERAPY EVALUATION
Acute Care - Speech Language Pathology Initial Evaluation  [facility]   Cognitive-Communication Evaluation     Patient Name: Yolanda Cannon  : 1985  MRN: [id number]  Today's Date: 2022               Admit Date: 2022     Visit Dx:    ICD-10-CM ICD-9-CM   1. Facial paresthesia  R20.2 782.0   2. Right sided weakness  R53.1 728.87   3. Cognitive communication deficit  R41.841 799.52     Patient Active Problem List   Diagnosis   • Somnolence   • Tobacco abuse     Past Medical History:   Diagnosis Date   • Anxiety    • Drug abuse (HCC)    • MRSA (methicillin resistant staph aureus) culture positive      Past Surgical History:   Procedure Laterality Date   • SKIN SURGERY      above eyebrow for MRSA       SLP Recommendation and Plan  SLP Diagnosis: Mild short-term memory difficulty. Otherwise, cognitive-communication skills WFL. Pt reported memory skills typically very strong. (22 1035)        [facility] Criteria for Skilled Therapy Interventions Met: yes (22 103)  Anticipated Discharge Disposition (SLP): anticipate therapy at next level of care, home with OP services (22 1035)        Predicted Duration Therapy Intervention (Days): until discharge (22 1035)         Plan of Care Reviewed With: patient (22 1303)  Progress: no change (22 1303)      SLP EVALUATION (last 72 hours)     SLP SLC Evaluation     Row Name 22 103                   Communication Assessment/Intervention    Document Type evaluation  -AC        Subjective Information no complaints  -AC        Patient Observations alert;cooperative  -AC        Patient/Family/Caregiver Comments/Observations No family present.  -AC        Patient Effort good  -AC                  General Information    Patient Profile Reviewed yes  -AC        Pertinent History Of Current Problem Adm w/ somnolence, code stroke. MRI negative. ? TIA per chart. Hx drug abuse (recovering, clean since , on suboxone).  -AC         Precautions/Limitations, Vision WFL;for purposes of eval  -AC        Precautions/Limitations, Hearing WFL;for purposes of eval  -AC        Patient Level of Education GED  -AC        Prior Level of Function-Communication WFL  -AC        Plans/Goals Discussed with patient;agreed upon  -AC        Barriers to Rehab none identified  -AC        Patient's Goals for Discharge return to all previous roles/activities  -AC                  Pain    Additional Documentation Pain Scale: FACES Pre/Post-Treatment (Group)  -AC                  Pain Scale: FACES Pre/Post-Treatment    Pain: FACES Scale, Pretreatment 0-->no hurt  -AC        Posttreatment Pain Rating 0-->no hurt  -AC                  Comprehension Assessment/Intervention    Comprehension Assessment/Intervention Auditory Comprehension;Reading Comprehension  -AC                  Auditory Comprehension Assessment/Intervention    Auditory Comprehension (Communication) WFL  -AC        Answers Questions (Communication) WFL;complex;yes/no;wh questions  -AC        Able to Follow Commands (Communication) WFL;2-step  -AC        Narrative Discourse WFL;conversational level  -AC                  Reading Comprehension Assessment/Intervention    Reading Comprehension (Communication) WFL  -AC        Paragraph Level WFL  -AC        Reading Comprehension, Comment Comprehension intact, but had difficulty recalling details to answer open-ended ?s after reading paragraph. Suspect STM issue.  -AC                  Expression Assessment/Intervention    Expression Assessment/Intervention verbal expression;graphic expression  -AC                  Verbal Expression Assessment/Intervention    Verbal Expression WFL  -AC        Automatic Speech (Communication) WFL;response to greeting  -AC        Responsive Naming WFL;complex  -AC        Confrontational Naming WFL;low frequency  -AC        Conversational Discourse/Fluency WFL  -AC                  Graphic Expression Assessment/Intervention     Graphic Expression WFL;dominant hand  -AC        Biographical Information WFL;name  -AC        Graphic Expression, Comment Able to generate complex written sentence using 2 key words.  -                  Motor Speech Assessment/Intervention    Motor Speech Function WFL;unfamiliar listener  -        Conversational Speech (Communication) WFL;moderate complexity  -        Motor Speech, Comment 100% intelligible to unfamiliar listener in conversational speech.  -                  Cognitive Assessment Intervention- SLP    Cognitive Function (Cognition) mild impairment  -        Orientation Status (Cognition) WFL;person;place;time;situation  -        Memory (Cognitive) mild impairment;short-term;delayed;unrelated;other (see comments)  difficulty recalling details after reading paragraph, 4/5 unrelated words recalled after 5-min delay  -        Attention (Cognitive) WFL;sustained  -AC        Thought Organization (Cognitive) WFL;concrete convergent;drawing conclusions  -AC        Reasoning (Cognitive) WFL;deductive  -AC        Problem Solving (Cognitive) WFL;temporal  -AC        Functional Math (Cognitive) WFL;money calculation;word problems  -                  SLP Evaluation Clinical Impressions    SLP Diagnosis Mild short-term memory difficulty. Otherwise, cognitive-communication skills WFL. Pt reported memory skills typically very strong.  -AC        Rehab Potential/Prognosis good  -        SLC Criteria for Skilled Therapy Interventions Met yes  -AC        Functional Impact difficulty completing vocational tasks  -                  Recommendations    Therapy Frequency (SLP SLC) 5 days per week  -        Predicted Duration Therapy Intervention (Days) until discharge  -        Anticipated Discharge Disposition (SLP) anticipate therapy at next level of care;home with OP services  -              User Key  (r) = Recorded By, (t) = Taken By, (c) = Cosigned By    Initials Name Effective Dates      Tracee Vargas MS CCC-SLP 06/16/21 -                    EDUCATION  The patient has been educated in the following areas:     Cognitive Impairment.           SLP GOALS     Row Name 04/19/22 1035             Memory Skills Goal 1 (SLP)    Improve Memory Skills Through Goal 1 (SLP) recalling unrelated word lists with an imposed delay;listen to multiple paragraphs and answer questions;read multiple paragraphs and answer questions;90%;independently (over 90% accuracy)  -AC      Time Frame (Memory Skills Goal 1, SLP) short term goal (STG)  -AC              Memory Skills Goal 2 (SLP)    Improve Memory Skills Through Goal 2 (SLP) use memory strategies;90%;independently (over 90% accuracy)  -AC      Time Frame (Memory Skills Goal 2, SLP) short term goal (STG)  -AC              Additional Goal 1 (SLP)    Additional Goal 1, SLP LTG: Pt will improve cognitive skills in order to return to prior home/work responsibilities w/ 100% acc independently.  -AC      Time Frame (Additional Goal 1, SLP) by discharge  -AC            User Key  (r) = Recorded By, (t) = Taken By, (c) = Cosigned By    Initials Name Provider Type    AC Tracee Vargas MS CCC-SLP Speech and Language Pathologist                        Time Calculation:      Time Calculation- SLP     Row Name 04/19/22 1303             Time Calculation- SLP    SLP Start Time 1035  -AC      SLP Received On 04/19/22  -AC              Untimed Charges    21684-PD Eval Speech and Production w/ Language Minutes 43  -AC              Total Minutes    Untimed Charges Total Minutes 43  -AC       Total Minutes 43  -AC            User Key  (r) = Recorded By, (t) = Taken By, (c) = Cosigned By    Initials Name Provider Type     Tracee Vargas MS CCC-SLP Speech and Language Pathologist                Therapy Charges for Today     Code Description Service Date Service Provider Modifiers Qty    [id number] HC ST EVAL SPEECH AND PROD W LANG  3 4/19/2022 Tracee Vargas MS CCC-SLP GN 1         Patient was not wearing a face mask and did not exhibit coughing during this therapy encounter.  Procedure performed was not aerosolizing, involved close contact (within 6 feet for at least 15 minutes or longer), and did not involve contact with infectious secretions or specimens.  Therapist used appropriate personal protective equipment including gloves, standard procedure mask and eye protection.  Appropriate PPE was worn during the entire therapy session.  Hand hygiene was completed before and after therapy session.              Tracee Vargas MS CCC-SLP  4/19/2022

## 2022-04-20 LAB
QT INTERVAL: 386 MS
QTC INTERVAL: 467 MS

## 2022-07-19 ENCOUNTER — PATIENT ROUNDING (BHMG ONLY) (OUTPATIENT)
Dept: FAMILY MEDICINE CLINIC | Facility: CLINIC | Age: 37
End: 2022-07-19

## 2022-07-19 ENCOUNTER — OFFICE VISIT (OUTPATIENT)
Dept: FAMILY MEDICINE CLINIC | Facility: CLINIC | Age: 37
End: 2022-07-19

## 2022-07-19 VITALS
WEIGHT: 115 LBS | BODY MASS INDEX: 18.48 KG/M2 | DIASTOLIC BLOOD PRESSURE: 60 MMHG | OXYGEN SATURATION: 97 % | SYSTOLIC BLOOD PRESSURE: 120 MMHG | HEART RATE: 79 BPM | HEIGHT: 66 IN

## 2022-07-19 DIAGNOSIS — Z72.0 TOBACCO ABUSE: ICD-10-CM

## 2022-07-19 DIAGNOSIS — F19.11 HISTORY OF SUBSTANCE ABUSE: ICD-10-CM

## 2022-07-19 DIAGNOSIS — F41.9 ANXIETY: ICD-10-CM

## 2022-07-19 DIAGNOSIS — Z00.00 ENCOUNTER FOR MEDICAL EXAMINATION TO ESTABLISH CARE: Primary | ICD-10-CM

## 2022-07-19 DIAGNOSIS — F33.1 MODERATE EPISODE OF RECURRENT MAJOR DEPRESSIVE DISORDER: ICD-10-CM

## 2022-07-19 PROCEDURE — 99214 OFFICE O/P EST MOD 30 MIN: CPT | Performed by: STUDENT IN AN ORGANIZED HEALTH CARE EDUCATION/TRAINING PROGRAM

## 2022-07-19 RX ORDER — MODAFINIL 100 MG/1
100 TABLET ORAL
COMMUNITY
Start: 2022-05-05

## 2022-07-19 RX ORDER — BUSPIRONE HYDROCHLORIDE 5 MG/1
5 TABLET ORAL AS NEEDED
COMMUNITY
Start: 2022-06-01

## 2022-07-19 RX ORDER — GABAPENTIN 300 MG/1
300 CAPSULE ORAL AS NEEDED
COMMUNITY
Start: 2022-06-24
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
## 2022-12-18 ENCOUNTER — TELEMEDICINE (OUTPATIENT)
Dept: FAMILY MEDICINE CLINIC | Facility: TELEHEALTH | Age: 37
End: 2022-12-18

## 2022-12-18 DIAGNOSIS — J22 LOWER RESPIRATORY INFECTION (E.G., BRONCHITIS, PNEUMONIA, PNEUMONITIS, PULMONITIS): Primary | ICD-10-CM

## 2022-12-18 PROCEDURE — 99213 OFFICE O/P EST LOW 20 MIN: CPT | Performed by: NURSE PRACTITIONER

## 2022-12-18 RX ORDER — CYCLOBENZAPRINE HCL 5 MG
TABLET ORAL
COMMUNITY
Start: 2022-10-14

## 2022-12-18 RX ORDER — HYDROXYZINE PAMOATE 25 MG/1
CAPSULE ORAL
COMMUNITY
Start: 2022-10-14

## 2022-12-18 RX ORDER — PSEUDOEPHED/ACETAMINOPH/DIPHEN 30MG-500MG
TABLET ORAL
COMMUNITY
Start: 2022-10-14

## 2022-12-18 RX ORDER — ALBUTEROL SULFATE 90 UG/1
2 AEROSOL, METERED RESPIRATORY (INHALATION) EVERY 4 HOURS PRN
Qty: 18 G | Refills: 0 | Status: SHIPPED | OUTPATIENT
Start: 2022-12-18

## 2022-12-18 RX ORDER — DEXTROMETHORPHAN HYDROBROMIDE AND PROMETHAZINE HYDROCHLORIDE 15; 6.25 MG/5ML; MG/5ML
5 SYRUP ORAL 4 TIMES DAILY PRN
Qty: 118 ML | Refills: 0 | Status: SHIPPED | OUTPATIENT
Start: 2022-12-18

## 2022-12-18 RX ORDER — AZITHROMYCIN 250 MG/1
TABLET, FILM COATED ORAL
Qty: 6 TABLET | Refills: 0 | Status: SHIPPED | OUTPATIENT
Start: 2022-12-18

## 2022-12-18 RX ORDER — TRAZODONE HYDROCHLORIDE 50 MG/1
TABLET ORAL
COMMUNITY
Start: 2022-10-14

## 2022-12-18 RX ORDER — CLONAZEPAM 1 MG/1
TABLET ORAL
COMMUNITY
Start: 2022-11-28

## 2022-12-18 RX ORDER — PREDNISONE 10 MG/1
TABLET ORAL DAILY
Qty: 21 EACH | Refills: 0 | Status: SHIPPED | OUTPATIENT
Start: 2022-12-18 | End: 2022-12-24

## 2022-12-18 RX ORDER — IBUPROFEN 400 MG/1
TABLET ORAL
COMMUNITY
Start: 2022-10-14

## 2022-12-18 RX ORDER — DIPHENHYDRAMINE HYDROCHLORIDE 25 MG/1
CAPSULE ORAL
COMMUNITY
Start: 2022-10-14

## 2022-12-18 NOTE — PATIENT INSTRUCTIONS
May take over-the-counter medicine as needed for symptoms.  Tylenol for pain and fever, increase fluids and rest.    Warning signs: trouble breathing, increasing shortness of breath, persistent chest pain or pressure, new confusion, inability to stay awake, pale, gray or blue-colored skin, lips or nail beds. If you show any of these signs seek Emergency Care.     If symptoms worsen or do not improve follow up with your PCP or visit your nearest Urgent Care Center or ER.
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
## 2023-04-13 ENCOUNTER — APPOINTMENT (OUTPATIENT)
Dept: GENERAL RADIOLOGY | Facility: HOSPITAL | Age: 38
End: 2023-04-13
Payer: MEDICAID

## 2023-04-13 VITALS
OXYGEN SATURATION: 98 % | DIASTOLIC BLOOD PRESSURE: 63 MMHG | HEART RATE: 90 BPM | RESPIRATION RATE: 16 BRPM | HEIGHT: 67 IN | TEMPERATURE: 99 F | BODY MASS INDEX: 18.68 KG/M2 | SYSTOLIC BLOOD PRESSURE: 100 MMHG | WEIGHT: 119 LBS

## 2023-04-13 PROCEDURE — 0202U NFCT DS 22 TRGT SARS-COV-2: CPT | Performed by: STUDENT IN AN ORGANIZED HEALTH CARE EDUCATION/TRAINING PROGRAM

## 2023-04-13 PROCEDURE — 99282 EMERGENCY DEPT VISIT SF MDM: CPT

## 2023-04-13 PROCEDURE — 71045 X-RAY EXAM CHEST 1 VIEW: CPT

## 2023-04-13 RX ORDER — IPRATROPIUM BROMIDE AND ALBUTEROL SULFATE 2.5; .5 MG/3ML; MG/3ML
3 SOLUTION RESPIRATORY (INHALATION) ONCE
Status: DISCONTINUED | OUTPATIENT
Start: 2023-04-13 | End: 2023-04-14 | Stop reason: HOSPADM

## 2023-04-14 ENCOUNTER — HOSPITAL ENCOUNTER (EMERGENCY)
Facility: HOSPITAL | Age: 38
Discharge: HOME | End: 2023-04-14
Attending: STUDENT IN AN ORGANIZED HEALTH CARE EDUCATION/TRAINING PROGRAM | Admitting: STUDENT IN AN ORGANIZED HEALTH CARE EDUCATION/TRAINING PROGRAM
Payer: MEDICAID

## 2023-04-14 DIAGNOSIS — J18.9 PNEUMONITIS: ICD-10-CM

## 2023-04-14 DIAGNOSIS — R05.2 SUBACUTE COUGH: Primary | ICD-10-CM

## 2023-04-14 DIAGNOSIS — Z20.822 COVID-19 RULED OUT BY LABORATORY TESTING: ICD-10-CM

## 2023-04-14 RX ORDER — AMOXICILLIN AND CLAVULANATE POTASSIUM 875; 125 MG/1; MG/1
1 TABLET, FILM COATED ORAL 2 TIMES DAILY
Qty: 14 TABLET | Refills: 0 | Status: SHIPPED | OUTPATIENT
Start: 2023-04-14 | End: 2023-04-21
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
